# Patient Record
Sex: FEMALE | Race: ASIAN | NOT HISPANIC OR LATINO | Employment: UNEMPLOYED | URBAN - METROPOLITAN AREA
[De-identification: names, ages, dates, MRNs, and addresses within clinical notes are randomized per-mention and may not be internally consistent; named-entity substitution may affect disease eponyms.]

---

## 2018-09-25 ENCOUNTER — HOSPITAL ENCOUNTER (EMERGENCY)
Facility: HOSPITAL | Age: 68
Discharge: HOME | End: 2018-09-25
Attending: EMERGENCY MEDICINE
Payer: MEDICARE

## 2018-09-25 VITALS
SYSTOLIC BLOOD PRESSURE: 155 MMHG | TEMPERATURE: 99 F | RESPIRATION RATE: 18 BRPM | HEART RATE: 66 BPM | WEIGHT: 157 LBS | OXYGEN SATURATION: 99 % | DIASTOLIC BLOOD PRESSURE: 68 MMHG

## 2018-09-25 DIAGNOSIS — I82.4Z2 DVT, LOWER EXTREMITY, DISTAL, ACUTE, LEFT: Primary | ICD-10-CM

## 2018-09-25 LAB
ALBUMIN SERPL BCP-MCNC: 3.7 G/DL
ALP SERPL-CCNC: 82 U/L
ALT SERPL W/O P-5'-P-CCNC: 76 U/L
ANION GAP SERPL CALC-SCNC: 11 MMOL/L
AST SERPL-CCNC: 37 U/L
BASOPHILS # BLD AUTO: 0 K/UL
BASOPHILS NFR BLD: 0.6 %
BILIRUB SERPL-MCNC: 0.4 MG/DL
BUN SERPL-MCNC: 21 MG/DL
CALCIUM SERPL-MCNC: 9.9 MG/DL
CHLORIDE SERPL-SCNC: 104 MMOL/L
CO2 SERPL-SCNC: 24 MMOL/L
CREAT SERPL-MCNC: 0.7 MG/DL
DIFFERENTIAL METHOD: ABNORMAL
EOSINOPHIL # BLD AUTO: 0.1 K/UL
EOSINOPHIL NFR BLD: 2.8 %
ERYTHROCYTE [DISTWIDTH] IN BLOOD BY AUTOMATED COUNT: 13.3 %
EST. GFR  (AFRICAN AMERICAN): >60 ML/MIN/1.73 M^2
EST. GFR  (NON AFRICAN AMERICAN): >60 ML/MIN/1.73 M^2
GLUCOSE SERPL-MCNC: 136 MG/DL
HCT VFR BLD AUTO: 40.3 %
HGB BLD-MCNC: 13.6 G/DL
INR PPP: 1
LYMPHOCYTES # BLD AUTO: 2.4 K/UL
LYMPHOCYTES NFR BLD: 47.3 %
MCH RBC QN AUTO: 32 PG
MCHC RBC AUTO-ENTMCNC: 33.8 G/DL
MCV RBC AUTO: 95 FL
MONOCYTES # BLD AUTO: 0.3 K/UL
MONOCYTES NFR BLD: 6.4 %
NEUTROPHILS # BLD AUTO: 2.2 K/UL
NEUTROPHILS NFR BLD: 42.9 %
PLATELET # BLD AUTO: 189 K/UL
PMV BLD AUTO: 7.9 FL
POTASSIUM SERPL-SCNC: 3.9 MMOL/L
PROT SERPL-MCNC: 6.8 G/DL
PROTHROMBIN TIME: 9.8 SEC
RBC # BLD AUTO: 4.25 M/UL
SODIUM SERPL-SCNC: 139 MMOL/L
WBC # BLD AUTO: 5.1 K/UL

## 2018-09-25 PROCEDURE — 25500020 PHARM REV CODE 255: Performed by: EMERGENCY MEDICINE

## 2018-09-25 PROCEDURE — 36415 COLL VENOUS BLD VENIPUNCTURE: CPT

## 2018-09-25 PROCEDURE — 85025 COMPLETE CBC W/AUTO DIFF WBC: CPT

## 2018-09-25 PROCEDURE — 85610 PROTHROMBIN TIME: CPT

## 2018-09-25 PROCEDURE — 25000003 PHARM REV CODE 250: Performed by: EMERGENCY MEDICINE

## 2018-09-25 PROCEDURE — 99284 EMERGENCY DEPT VISIT MOD MDM: CPT | Mod: 25

## 2018-09-25 PROCEDURE — 80053 COMPREHEN METABOLIC PANEL: CPT

## 2018-09-25 RX ADMIN — IOHEXOL 100 ML: 350 INJECTION, SOLUTION INTRAVENOUS at 06:09

## 2018-09-25 RX ADMIN — RIVAROXABAN 15 MG: 15 TABLET, FILM COATED ORAL at 07:09

## 2018-09-25 NOTE — ED PROVIDER NOTES
"Encounter Date: 9/25/2018    SCRIBE #1 NOTE: I, Ayleen Galarza, am scribing for, and in the presence of, .       History     Chief Complaint   Patient presents with    Leg Pain     arrives with us results.        Time seen by provider: 4:57 PM on 09/25/2018    Tamica Sherman is a 68 y.o. female with HTN, IDDM, HLD,  who presents to the ED with leg swelling onset 1 week. Patient explains that she began having swelling in her left leg and had an US today resulting in a "partial non-obstructing thrombus in the left peroneal vein". She denies any traveling anywhere far, surgeries, immobility, or hormone therapy. Patient endorses having a PCP appointment in 6 days. She complains of intermittent shortness of breath for the past few weeks. Patient denies any chest pain, abdominal pain, vomiting, diarrhea, fever, or back pain.       The history is provided by the patient.     Review of patient's allergies indicates:  No Known Allergies  Past Medical History:   Diagnosis Date    Diabetes mellitus     High cholesterol     Hypertension      History reviewed. No pertinent surgical history.  History reviewed. No pertinent family history.  Social History     Tobacco Use    Smoking status: Never Smoker   Substance Use Topics    Alcohol use: Not on file    Drug use: Not on file     Review of Systems   Constitutional: Negative for fever.   HENT: Negative for drooling and sore throat.    Eyes: Negative for photophobia and visual disturbance.   Respiratory: Positive for shortness of breath. Negative for cough.    Cardiovascular: Positive for leg swelling. Negative for chest pain.   Gastrointestinal: Negative for abdominal pain, diarrhea, nausea and vomiting.   Genitourinary: Negative for dysuria.   Musculoskeletal: Negative for back pain.   Skin: Negative for rash.   Neurological: Negative for weakness and numbness.   Hematological: Does not bruise/bleed easily.   Psychiatric/Behavioral: Negative for confusion.       Physical " Exam     Initial Vitals [09/25/18 1557]   BP Pulse Resp Temp SpO2   (!) 160/72 60 18 98.6 °F (37 °C) 98 %      MAP       --         Physical Exam    Nursing note and vitals reviewed.  Constitutional: She appears well-developed and well-nourished.  Non-toxic appearance. No distress.   HENT:   Head: Normocephalic and atraumatic.   Eyes: EOM are normal. Pupils are equal, round, and reactive to light.   Neck: Normal range of motion. Neck supple. No neck rigidity. No JVD present.   Cardiovascular: Normal rate, regular rhythm, normal heart sounds and intact distal pulses. Exam reveals no gallop and no friction rub.    No murmur heard.  Pulmonary/Chest: Breath sounds normal. She has no wheezes. She has no rhonchi. She has no rales.   Abdominal: Soft. Bowel sounds are normal. She exhibits no distension. There is no tenderness. There is no rigidity, no rebound and no guarding.   Musculoskeletal: Normal range of motion. She exhibits edema.   Pitting edema to LLE.    Neurological: She is alert and oriented to person, place, and time. She has normal strength and normal reflexes. No cranial nerve deficit or sensory deficit. She exhibits normal muscle tone. Coordination normal. GCS eye subscore is 4. GCS verbal subscore is 5. GCS motor subscore is 6.   Skin: Skin is warm and dry.   Psychiatric: She has a normal mood and affect. Her speech is normal and behavior is normal. She is not actively hallucinating.         ED Course   Procedures  Labs Reviewed   CBC W/ AUTO DIFFERENTIAL - Abnormal; Notable for the following components:       Result Value    MCH 32.0 (*)     MPV 7.9 (*)     All other components within normal limits   COMPREHENSIVE METABOLIC PANEL - Abnormal; Notable for the following components:    Glucose 136 (*)     ALT 76 (*)     All other components within normal limits   PROTIME-INR          Imaging Results          CTA Chest Non-Coronary (PE Study) (Final result)  Result time 09/25/18 18:35:40    Final result by  Omar Irizarry MD (09/25/18 18:35:40)                 Impression:      No pulmonary embolus.      Electronically signed by: Omar Irizarry MD  Date:    09/25/2018  Time:    18:35             Narrative:    EXAMINATION:  CTA CHEST NON CORONARY    CLINICAL HISTORY:  Chest pain, acute, PE suspected, high pretest prob;    TECHNIQUE:  Low dose axial images, sagittal and coronal reformations were obtained from the thoracic inlet to the lung bases following the IV administration of 100 mL of Omnipaque 350.  Contrast timing was optimized to evaluate the pulmonary arteries.  MIP images were performed.    COMPARISON:  None    FINDINGS:  No pulmonary emboli present.    Thoracic aorta demonstrates ascending segment ectasia.  No dissection.  Heart demonstrates no significant chamber enlargement.  LAD coronary calcification present.  No pleural or pericardial effusions present.    No mediastinal, hilar or axillary adenopathy.    Lungs demonstrate atelectatic changes.    Abdomen demonstrates hepatic steatosis with benign cysts.  There is chronic compression deformity of T12.  No acute osseous abnormality.                                 Medical Decision Making:   History:   Old Medical Records: I decided to obtain old medical records.  Initial Assessment:   68-year-old woman sent over from her primary care physician's office after having an outpatient ultrasound that showed nonocclusive left peroneal vein DVT.  Patient endorses is some mild pleuritic chest pain with review of systems.  CTA performed shows no evidence of pulmonary embolism.  Patient will be started on Xarelto.  She is discharged in no acute distress. She is to follow up with her PCP for continued management of her medications and DVT.  Return precautions discussed.  Clinical Tests:   Lab Tests: Ordered and Reviewed  Radiological Study: Ordered and Reviewed            Scribe Attestation:   Scribe #1: I performed the above scribed service and the  documentation accurately describes the services I performed. I attest to the accuracy of the note.    I, Gerardo Doyle, personally performed the services described in this documentation. All medical record entries made by the scribe were at my direction and in my presence.  I have reviewed the chart and agree that the record reflects my personal performance and is accurate and complete. Noah Carrillo MD.  9:05 PM 09/26/2018             Clinical Impression:   The encounter diagnosis was DVT, lower extremity, distal, acute, left.      Disposition:   Disposition: Discharged  Condition: Stable                        Noah Carrillo MD  09/26/18 4767

## 2018-09-26 NOTE — ED NOTES
"Presents to the ER with c/o left leg pain and swelling that started a few days ago. Patient reports having an US done today that showed a "Non obstructing embolus." Patient denies any CP or SOB. + pulse to left lower extremity. Mucous membranes are pink and moist. Skin is warm, dry and intact. Lungs are clear bilaterally, respirations are regular and unlabored. Denies cough, congestion, rhinorrhea or SOB. BS active x4, no tenderness with palpation, abd is soft and not distended. Denies any appetite or activity change. S1S2, capillary refill is < 2 seconds. Denies dysuria, difficulty urinating, frequency, numbness, tingling or weakness. SANDRA BALTAZARS    "

## 2018-09-26 NOTE — ED NOTES
Upon discharge, patient is AAOx4, no cardiac or respiratory complications. Follow up care and  Medications have been reviewed with patient and has been instructed to return to the ER if needed. Patient verbalized understanding and ambulated to the lobby without difficulty. PREMA FLORES.

## 2019-01-06 PROBLEM — I82.452 ACUTE DEEP VEIN THROMBOSIS (DVT) OF LEFT PERONEAL VEIN: Status: ACTIVE | Noted: 2019-01-06

## 2019-01-07 ENCOUNTER — TELEPHONE (OUTPATIENT)
Dept: HEMATOLOGY/ONCOLOGY | Facility: CLINIC | Age: 69
End: 2019-01-07

## 2019-01-07 NOTE — TELEPHONE ENCOUNTER
Called pt to r/s the missed apt from today but no answer and v/m is full. Will try again another time. AE

## 2019-01-29 NOTE — PROGRESS NOTES
[facility] Hematolgy/Oncology  History & Physical    Subjective:      Patient ID:   NAME: Tamica Sherman : 1950     69 y.o. female    Referring Doc: Oziel Campos MD  Other Physicians:        Chief Complaint: left DVT    HPI:  69 y.o. female with diagnosis of Left peroneal vein DVT who has been referred by Oziel Campos MD for evaluation by medical hematology. She is here with her daughter. She been having LLE swelling of the LLE for about a year now. She was diagnosed with DVT involving the left peroneal vein in 2018. She denies any other history of clots in past or prior injury or surgery. She denies any knowledge of a family history of clots. She is currently on xarelto. She denies any excessive bleeding or bruising. She denies any CP, SOB, HA's or N/V. She had recent mammogram that was negative per her daughter.               ROS:   GEN: normal without any fever, night sweats or weight loss  HEENT: normal with no HA's, sore throat, stiff neck, changes in vision  CV: normal with no CP, SOB, PND, RUIZ or orthopnea  PULM: normal with no SOB, cough, hemoptysis, sputum or pleuritic pain  GI: normal with no abdominal pain, nausea, vomiting, constipation, diarrhea, melanotic stools, BRBPR, or hematemesis  : normal with no hematuria, dysuria  BREAST: normal with no mass, discharge, pain  SKIN: normal with no rash, erythema, bruising, or swelling       Past Medical/Surgical History:  Past Medical History:   Diagnosis Date    Acute deep vein thrombosis (DVT) of left peroneal vein 2019    Diabetes mellitus     High cholesterol     Hypertension      No past surgical history on file.      Allergies:  Review of patient's allergies indicates:  No Known Allergies    Social/Family History:  Social History     Socioeconomic History    Marital status:      Spouse name: Not on file    Number of children: Not on file    Years of education: Not on file    Highest education level: Not on file   Social  "Needs    Financial resource strain: Not on file    Food insecurity - worry: Not on file    Food insecurity - inability: Not on file    Transportation needs - medical: Not on file    Transportation needs - non-medical: Not on file   Occupational History    Not on file   Tobacco Use    Smoking status: Never Smoker   Substance and Sexual Activity    Alcohol use: Not on file    Drug use: Not on file    Sexual activity: Not on file   Other Topics Concern    Not on file   Social History Narrative    Not on file     No family history on file.      Medications:    Current Outpatient Medications:     metFORMIN (GLUCOPHAGE) 500 MG tablet, Take 500 mg by mouth 2 (two) times daily with meals., Disp: , Rfl:     rivaroxaban (XARELTO) 20 mg Tab, Take 1 tablet (20 mg total) by mouth daily with dinner or evening meal., Disp: 30 tablet, Rfl: 2      Pathology:  Cancer Staging  No matching staging information was found for the patient.      Objective:   Vitals:  Blood pressure (!) 152/79, pulse 72, temperature 98 °F (36.7 °C), resp. rate 20, height 5' 1" (1.549 m), weight 67.3 kg (148 lb 6.4 oz).    Physical Examination:   GEN: no apparent distress, comfortable; AAOx3  HEAD: atraumatic and normocephalic  EYES: no pallor, no icterus, PERRLA  ENT: OMM, no pharyngeal erythema, external ears WNL; no nasal discharge; no thrush  NECK: no masses, thyroid normal, trachea midline, no LAD/LN's, supple  CV: RRR with no murmur; normal pulse; normal S1 and S2; no pedal edema  CHEST: Normal respiratory effort; CTAB; normal breath sounds; no wheeze or crackles  ABDOM: nontender and nondistended; soft; normal bowel sounds; no rebound/guarding  MUSC/Skeletal: ROM normal; no crepitus; joints normal; no deformities or arthropathy  EXTREM: no clubbing, cyanosis, inflammation or swelling  SKIN: no rashes, lesions, ulcers, petechiae or subcutaneous nodules  : no sheridan  NEURO: grossly intact; motor/sensory WNL; AAOx3; no tremors  PSYCH: " normal mood, affect and behavior  LYMPH: normal cervical, supraclavicular, axillary and groin LN's      Labs:   Lab Results   Component Value Date    WBC 5.10 09/25/2018    HGB 13.6 09/25/2018    HCT 40.3 09/25/2018    MCV 95 09/25/2018     09/25/2018    CMP  Sodium   Date Value Ref Range Status   09/25/2018 139 136 - 145 mmol/L Final     Potassium   Date Value Ref Range Status   09/25/2018 3.9 3.5 - 5.1 mmol/L Final     Chloride   Date Value Ref Range Status   09/25/2018 104 95 - 110 mmol/L Final     CO2   Date Value Ref Range Status   09/25/2018 24 23 - 29 mmol/L Final     Glucose   Date Value Ref Range Status   09/25/2018 136 (H) 70 - 110 mg/dL Final     BUN, Bld   Date Value Ref Range Status   09/25/2018 21 8 - 23 mg/dL Final     Creatinine   Date Value Ref Range Status   09/25/2018 0.7 0.5 - 1.4 mg/dL Final     Calcium   Date Value Ref Range Status   09/25/2018 9.9 8.7 - 10.5 mg/dL Final     Total Protein   Date Value Ref Range Status   09/25/2018 6.8 6.0 - 8.4 g/dL Final     Albumin   Date Value Ref Range Status   09/25/2018 3.7 3.5 - 5.2 g/dL Final     Total Bilirubin   Date Value Ref Range Status   09/25/2018 0.4 0.1 - 1.0 mg/dL Final     Comment:     For infants and newborns, interpretation of results should be based  on gestational age, weight and in agreement with clinical  observations.  Premature Infant recommended reference ranges:  Up to 24 hours.............<8.0 mg/dL  Up to 48 hours............<12.0 mg/dL  3-5 days..................<15.0 mg/dL  6-29 days.................<15.0 mg/dL       Alkaline Phosphatase   Date Value Ref Range Status   09/25/2018 82 55 - 135 U/L Final     AST   Date Value Ref Range Status   09/25/2018 37 10 - 40 U/L Final     ALT   Date Value Ref Range Status   09/25/2018 76 (H) 10 - 44 U/L Final     Anion Gap   Date Value Ref Range Status   09/25/2018 11 8 - 16 mmol/L Final     eGFR if    Date Value Ref Range Status   09/25/2018 >60 >60 mL/min/1.73 m^2  Final     eGFR if non    Date Value Ref Range Status   09/25/2018 >60 >60 mL/min/1.73 m^2 Final     Comment:     Calculation used to obtain the estimated glomerular filtration  rate (eGFR) is the CKD-EPI equation.            Radiology/Diagnostic Studies:          All lab results and imaging results have been reviewed and discussed with the patient    Assessment:   (1) 69 y.o. female  with diagnosis of Left peroneal vein DVT who has been referred by Oziel Campos MD for evaluation by medical hematology.   - she is currently on xarelto  - no prior history of clots or family history of clots    (2) DM    (3) HTN    (4) Hypercholesterolemia    (5) chronic pain syndrome involving leg mostly    VISIT DIAGNOSES:              Acute deep vein thrombosis (DVT) of left peroneal vein            Plan:     PLAN:  1. Order clot workup  2. Schedule repeat US of LLE  3. Schedule CT of abdom and pelvis  4. F/u with PCP  RTC in  3-4 weeks   Fax note to Oziel Campos MD        I have explained and the patient understands all of  the current recommendation(s). I have answered all of their questions to the best of my ability and to their complete satisfaction.             Thank you for allowing me to participate in this patient's care. Please call with any questions or concerns.    Electronically signed Johnnie Norton MD

## 2019-01-30 ENCOUNTER — OFFICE VISIT (OUTPATIENT)
Dept: HEMATOLOGY/ONCOLOGY | Facility: CLINIC | Age: 69
End: 2019-01-30
Payer: MEDICARE

## 2019-01-30 VITALS
HEIGHT: 61 IN | TEMPERATURE: 98 F | DIASTOLIC BLOOD PRESSURE: 79 MMHG | WEIGHT: 148.38 LBS | BODY MASS INDEX: 28.01 KG/M2 | RESPIRATION RATE: 20 BRPM | HEART RATE: 72 BPM | SYSTOLIC BLOOD PRESSURE: 152 MMHG

## 2019-01-30 DIAGNOSIS — I82.91 CHRONIC EMBOLISM AND THROMBOSIS OF VEIN: ICD-10-CM

## 2019-01-30 DIAGNOSIS — I82.452 ACUTE DEEP VEIN THROMBOSIS (DVT) OF LEFT PERONEAL VEIN: Primary | ICD-10-CM

## 2019-01-30 PROCEDURE — 99203 OFFICE O/P NEW LOW 30 MIN: CPT | Mod: ,,, | Performed by: INTERNAL MEDICINE

## 2019-01-30 PROCEDURE — 99203 PR OFFICE/OUTPT VISIT, NEW, LEVL III, 30-44 MIN: ICD-10-PCS | Mod: ,,, | Performed by: INTERNAL MEDICINE

## 2019-01-30 RX ORDER — METFORMIN HYDROCHLORIDE 1000 MG/1
1000 TABLET ORAL 2 TIMES DAILY WITH MEALS
COMMUNITY

## 2019-01-30 NOTE — LETTER
January 30, 2019      Oziel Campos MD  [address] [place] [address] [address]  [place] [address]           [facility] - Hematology Oncology  [address]  [address]  [place] [address]  Phone: [phone]  Fax: [phone]          Patient: Tamica Sherman   MR Number: [id number]   YOB: 1950   Date of Visit: 1/30/2019       Dear Dr. Oziel Campos:    Thank you for referring Tamica Sherman to me for evaluation. Attached you will find relevant portions of my assessment and plan of care.    If you have questions, please do not hesitate to call me. I look forward to following Tamica Sherman along with you.    Sincerely,    Johnnie Norton MD    Enclosure  CC:  No Recipients    If you would like to receive this communication electronically, please contact [email or web address] or [phone] to request more information on [employer] Link access.    For providers and/or their staff who would like to refer a patient to [facility], please contact us through our one-stop-shop provider referral line, [facility], at [phone].    If you feel you have received this communication in error or would no longer like to receive these types of communications, please e-mail [email or web address]

## 2019-02-05 LAB
APCR PPP: 2.4 RATIO (ref 2.2–3.5)
AT III ACT/NOR PPP CHRO: 106 % (ref 75–135)
AT III AG PPP IA-ACNC: 97 % (ref 72–124)
CARDIOLIPIN IGG SER IA-ACNC: <9 GPL U/ML (ref 0–14)
CARDIOLIPIN IGM SER IA-ACNC: 18 MPL U/ML (ref 0–12)
CREAT SERPL-MCNC: 0.66 MG/DL (ref 0.57–1)
FACT IX ACT/NOR PPP: 139 % (ref 60–177)
FACT VII AG ACT/NOR PPP IA: 145 %
FACT XIII CLOT DIS 24H PPP QL: NORMAL
HCYS SERPL-SCNC: 9.2 UMOL/L (ref 0–15)
LA 2 SCREEN W REFLEX-IMP: NORMAL
PROT C ACT/NOR PPP: 191 % (ref 73–180)
PROT S ACT/NOR PPP: 84 % (ref 63–140)
PROTHROM ACT/NOR PPP: 127 % (ref 50–154)
PS IGA SER-ACNC: 1 APS IGA (ref 0–20)
PS IGG SER-ACNC: 3 GPS IGG (ref 0–11)
PS IGM SER-ACNC: 18 MPS IGM (ref 0–25)
SCREEN APTT: 32 SEC (ref 0–51.9)
SCREEN DRVVT: 34.4 SEC (ref 0–47)

## 2019-02-06 LAB — MTHFR GENE MUT ANL BLD/T: NORMAL

## 2019-03-31 PROBLEM — R76.0 ANTICARDIOLIPIN ANTIBODY POSITIVE: Status: ACTIVE | Noted: 2019-03-31

## 2019-04-01 ENCOUNTER — OFFICE VISIT (OUTPATIENT)
Dept: HEMATOLOGY/ONCOLOGY | Facility: CLINIC | Age: 69
End: 2019-04-01
Payer: MEDICARE

## 2019-04-01 VITALS
TEMPERATURE: 98 F | BODY MASS INDEX: 26.83 KG/M2 | HEART RATE: 73 BPM | RESPIRATION RATE: 20 BRPM | WEIGHT: 142 LBS | DIASTOLIC BLOOD PRESSURE: 58 MMHG | SYSTOLIC BLOOD PRESSURE: 109 MMHG

## 2019-04-01 DIAGNOSIS — Z15.89 COMPOUND HETEROZYGOUS MTHFR MUTATION C677T/A1298C: ICD-10-CM

## 2019-04-01 DIAGNOSIS — I82.452 ACUTE DEEP VEIN THROMBOSIS (DVT) OF LEFT PERONEAL VEIN: Primary | ICD-10-CM

## 2019-04-01 DIAGNOSIS — R76.0 ANTICARDIOLIPIN ANTIBODY POSITIVE: ICD-10-CM

## 2019-04-01 PROCEDURE — 99215 OFFICE O/P EST HI 40 MIN: CPT | Mod: ,,, | Performed by: INTERNAL MEDICINE

## 2019-04-01 PROCEDURE — 99215 PR OFFICE/OUTPT VISIT, EST, LEVL V, 40-54 MIN: ICD-10-PCS | Mod: ,,, | Performed by: INTERNAL MEDICINE

## 2019-04-01 RX ORDER — GLIPIZIDE 10 MG/1
TABLET ORAL
COMMUNITY
End: 2020-07-30 | Stop reason: CLARIF

## 2019-04-01 RX ORDER — LISINOPRIL AND HYDROCHLOROTHIAZIDE 12.5; 2 MG/1; MG/1
TABLET ORAL
COMMUNITY
End: 2020-07-30 | Stop reason: CLARIF

## 2019-04-01 RX ORDER — IBUPROFEN 600 MG/1
600 TABLET ORAL 3 TIMES DAILY PRN
Status: ON HOLD | COMMUNITY
Start: 2019-01-03 | End: 2020-07-30

## 2019-04-01 NOTE — PROGRESS NOTES
[facility] Hematology/Oncology  PROGRESS NOTE - 2nd Follow-up Visit      Subjective:       Patient ID:   NAME: Tamica Sherman : 1950     69 y.o. female    Referring Doc: Andres  Other Physicians:    Chief Complaint: left DVT f/u        History of Present Illness:     Patient returns today for a 2nd regularly scheduled follow-up visit.  The patient is here today to go over the results of the recently ordered labs, tests and studies. She is here with a female relative (daughter). Labs show that she is heterozygous positive for both MTHFR A and C genes.  She has some itching at this time of the legs but no swelling or erythema. She is breathing ok and denies any CP, SOB, HA's or N/V.             ROS:   GEN: normal without any fever, night sweats or weight loss  HEENT: normal with no HA's, sore throat, stiff neck, changes in vision  CV: normal with no CP, SOB, PND, RUIZ or orthopnea  PULM: normal with no SOB, cough, hemoptysis, sputum or pleuritic pain  GI: normal with no abdominal pain, nausea, vomiting, constipation, diarrhea, melanotic stools, BRBPR, or hematemesis  : normal with no hematuria, dysuria  BREAST: normal with no mass, discharge, pain  SKIN: normal with no rash, erythema, bruising, or swelling; some itching    Allergies:  Review of patient's allergies indicates:  No Known Allergies    Medications:    Current Outpatient Medications:     glipiZIDE (GLUCOTROL) 10 MG tablet, glipizide 10 mg tablet  Take 1 tablet every day by oral route., Disp: , Rfl:     ibuprofen (ADVIL,MOTRIN) 800 MG tablet, , Disp: , Rfl:     lisinopril-hydrochlorothiazide (PRINZIDE,ZESTORETIC) 20-12.5 mg per tablet, lisinopril 20 mg-hydrochlorothiazide 12.5 mg tablet  Take 1 tablet every day by oral route., Disp: , Rfl:     metFORMIN (GLUCOPHAGE) 500 MG tablet, Take 500 mg by mouth 2 (two) times daily with meals., Disp: , Rfl:     rivaroxaban (XARELTO) 20 mg Tab, Take 1 tablet (20 mg total) by mouth daily with dinner or evening meal.,  Disp: 30 tablet, Rfl: 2    PMHx/PSHx Updates:  See patient's last visit with me on 1/30/2019.  See H&P on 1/30/2019        Pathology:  Cancer Staging  No matching staging information was found for the patient.          Objective:     Vitals:  Blood pressure (!) 109/58, pulse 73, temperature 97.6 °F (36.4 °C), resp. rate 20, weight 64.4 kg (142 lb).    Physical Examination:   GEN: no apparent distress, comfortable; AAOx3  HEAD: atraumatic and normocephalic  EYES: no pallor, no icterus, PERRLA  ENT: OMM, no pharyngeal erythema, external ears WNL; no nasal discharge; no thrush  NECK: no masses, thyroid normal, trachea midline, no LAD/LN's, supple  CV: RRR with no murmur; normal pulse; normal S1 and S2; no pedal edema  CHEST: Normal respiratory effort; CTAB; normal breath sounds; no wheeze or crackles  ABDOM: nontender and nondistended; soft; normal bowel sounds; no rebound/guarding  MUSC/Skeletal: ROM normal; no crepitus; joints normal; no deformities or arthropathy  EXTREM: no clubbing, cyanosis, inflammation or swelling  SKIN: no rashes, lesions, ulcers, petechiae or subcutaneous nodules  : no sheridan  NEURO: grossly intact; motor/sensory WNL; AAOx3; no tremors  PSYCH: normal mood, affect and behavior  LYMPH: normal cervical, supraclavicular, axillary and groin LN's            Labs:       1/30/3019    Anticardiolipin IgM 0 - 12 MPL U/mL 18High      MTHFR Comment    Comment: Result: [id number]   Two mutations (C677T and L0099N) identified   Interpretation:   This individual is heterzygous for both the MTHFR C677T and F1320Y   variants (one copy of each).      Lupus Anticoagulant Eval w/reflex   Order: [id number]   Status:  Final result   Visible to patient:  No (Not Released) Next appt:  None Dx:  Acute deep vein thrombosis (DVT) of l...    Ref Range & Units 2mo ago   PTT Lupus Anticoagulant 0.0 - 51.9 sec 32.0    Dilute Viper Venom Time 0.0 - 47.0 sec 34.4    Interpretation  Comment:    Comment: No lupus  anticoagulant was detected.           Homocysteine 0.0 - 15.0 umol/L 9.2       Antithrombin III   Order: [id number]   Status:  Final result   Visible to patient:  No (Not Released) Next appt:  None    Ref Range & Units 2mo ago   Antithrombin Activity 75 - 135 % 106    Comment: Direct Xa inhibitor anticoagulants such as rivaroxaban, apixaban and   edoxaban will lead to spuriously elevated antithrombin activity   levels possibly masking a deficiency.    Antithrombin Antigen, P 72 - 124 % 97                Radiology/Diagnostic Studies:    No results found.    I have reviewed all available lab results and radiology reports.    Assessment/Plan:   (1) 69 y.o. female  with diagnosis of Left peroneal vein DVT who has been referred by Oziel Campos MD for evaluation by medical hematology.   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 recommend consideration for continuation of the xarelto possibly long-term given the possible anticardiolipin issue  2. If she stops the blood thinner at any point in the future, I would then recommend getting a repeat Anticardiolipin IgM about 2 months thereafter  3. Add folbic  4. I discussed the genetic implications in children and siblings with regard to the MTHFR gene abnormalities  RTC in  6 months  Fax note to Oziel Campos MD,    Discussion:       I spent over 25 mins of time with the patient. Reviewed results of the recently ordered labs, tests and studies; made directives with regards to the results.  Over half of this time was spent couseling and coordinating care.    I have explained all of the above in detail and the patient understands all of the current recommendation(s). I have answered all of their questions to the best of my ability and to their complete satisfaction.   The patient is to continue with the current management plan.            Electronically signed by Johnnie Norton MD

## 2019-04-01 NOTE — LETTER
April 1, 2019      Oziel Campos MD  [address] [place] [address] [address]  [place] [address]           [facility] - Hematology Oncology  [address]  [address]  [place] [address]  Phone: [phone]  Fax: [phone]          Patient: Tamica Sherman   MR Number: [id number]   YOB: 1950   Date of Visit: 4/1/2019       Dear Dr. Oziel Campos:    Thank you for referring Tamica Sherman to me for evaluation. Attached you will find relevant portions of my assessment and plan of care.    If you have questions, please do not hesitate to call me. I look forward to following Tamica Sherman along with you.    Sincerely,    Johnnie Norton MD    Enclosure  CC:  No Recipients    If you would like to receive this communication electronically, please contact [email or web address] or [phone] to request more information on [email or web address] access.    For providers and/or their staff who would like to refer a patient to [facility], please contact us through our one-stop-shop provider referral line, [facility], at [phone].    If you feel you have received this communication in error or would no longer like to receive these types of communications, please e-mail [email or web address]

## 2019-11-11 ENCOUNTER — OFFICE VISIT (OUTPATIENT)
Dept: HEMATOLOGY/ONCOLOGY | Facility: CLINIC | Age: 69
End: 2019-11-11
Payer: MEDICARE

## 2019-11-11 VITALS
BODY MASS INDEX: 26.55 KG/M2 | SYSTOLIC BLOOD PRESSURE: 107 MMHG | RESPIRATION RATE: 18 BRPM | DIASTOLIC BLOOD PRESSURE: 65 MMHG | WEIGHT: 140.5 LBS | HEART RATE: 72 BPM | TEMPERATURE: 98 F

## 2019-11-11 DIAGNOSIS — R76.0 ANTICARDIOLIPIN ANTIBODY POSITIVE: Primary | ICD-10-CM

## 2019-11-11 DIAGNOSIS — I82.452 ACUTE DEEP VEIN THROMBOSIS (DVT) OF LEFT PERONEAL VEIN: ICD-10-CM

## 2019-11-11 DIAGNOSIS — Z15.89 COMPOUND HETEROZYGOUS MTHFR MUTATION C677T/A1298C: ICD-10-CM

## 2019-11-11 PROCEDURE — 99213 PR OFFICE/OUTPT VISIT, EST, LEVL III, 20-29 MIN: ICD-10-PCS | Mod: S$GLB,,, | Performed by: INTERNAL MEDICINE

## 2019-11-11 PROCEDURE — 99213 OFFICE O/P EST LOW 20 MIN: CPT | Mod: S$GLB,,, | Performed by: INTERNAL MEDICINE

## 2019-11-11 RX ORDER — DICLOFENAC SODIUM 10 MG/G
GEL TOPICAL
Status: ON HOLD | COMMUNITY
Start: 2019-10-14 | End: 2020-07-30

## 2019-11-11 RX ORDER — DULAGLUTIDE 0.75 MG/.5ML
INJECTION, SOLUTION SUBCUTANEOUS
Status: ON HOLD | COMMUNITY
Start: 2019-10-14 | End: 2020-07-30

## 2019-11-11 RX ORDER — ICOSAPENT ETHYL 1000 MG/1
1 CAPSULE ORAL 4 TIMES DAILY
COMMUNITY
Start: 2019-10-14

## 2019-11-11 RX ORDER — HYDROCORTISONE 25 MG/G
CREAM TOPICAL
COMMUNITY
Start: 2019-10-14 | End: 2020-07-30 | Stop reason: CLARIF

## 2019-11-11 RX ORDER — CANAGLIFLOZIN 300 MG/1
300 TABLET, FILM COATED ORAL DAILY
COMMUNITY
Start: 2019-10-14

## 2019-11-11 NOTE — PROGRESS NOTES
[facility] Hematology/Oncology  PROGRESS NOTE -   Follow-up Visit      Subjective:       Patient ID:   NAME: Tamica Sherman : 1950     69 y.o. female    Referring Doc: Andres  Other Physicians:    Chief Complaint: left DVT f/u        History of Present Illness:     Patient returns today for a regularly scheduled follow-up visit.  The patient is here today to go over the results of the recently ordered labs, tests and studies. She is here with her  and daughter. Labs previously showed that she is heterozygous positive for both MTHFR A and C genes.  No swelling or erythema. She is breathing ok and denies any CP, SOB, HA's or N/V. She is now off the blood thinners.             ROS:   GEN: normal without any fever, night sweats or weight loss  HEENT: normal with no HA's, sore throat, stiff neck, changes in vision  CV: normal with no CP, SOB, PND, RUIZ or orthopnea  PULM: normal with no SOB, cough, hemoptysis, sputum or pleuritic pain  GI: normal with no abdominal pain, nausea, vomiting, constipation, diarrhea, melanotic stools, BRBPR, or hematemesis  : normal with no hematuria, dysuria  BREAST: normal with no mass, discharge, pain  SKIN: normal with no rash, erythema, bruising, or swelling;     Allergies:  Review of patient's allergies indicates:  No Known Allergies    Medications:    Current Outpatient Medications:     folic acid-vit B6-vit B12 2.5-25-2 mg (FOLBIC OR EQUIV) 2.5-25-2 mg Tab, Take 1 tablet by mouth once daily., Disp: 30 tablet, Rfl: 6    glipiZIDE (GLUCOTROL) 10 MG tablet, glipizide 10 mg tablet  Take 1 tablet every day by oral route., Disp: , Rfl:     hydrocortisone 2.5 % cream, , Disp: , Rfl:     INVOKANA 300 mg Tab tablet, , Disp: , Rfl:     lisinopril-hydrochlorothiazide (PRINZIDE,ZESTORETIC) 20-12.5 mg per tablet, lisinopril 20 mg-hydrochlorothiazide 12.5 mg tablet  Take 1 tablet every day by oral route., Disp: , Rfl:     metFORMIN (GLUCOPHAGE) 500 MG tablet, Take 500 mg by mouth 2 (two)  times daily with meals., Disp: , Rfl:     rivaroxaban (XARELTO) 20 mg Tab, Take 1 tablet (20 mg total) by mouth daily with dinner or evening meal., Disp: 30 tablet, Rfl: 2    TRULICITY 0.75 mg/0.5 mL PnIj, , Disp: , Rfl:     VASCEPA 1 gram Cap, , Disp: , Rfl:     VOLTAREN 1 % Gel, , Disp: , Rfl:     ibuprofen (ADVIL,MOTRIN) 800 MG tablet, , Disp: , Rfl:     PMHx/PSHx Updates:  See patient's last visit with me on 4/1/2019.  See H&P on 1/30/2019        Pathology:  Cancer Staging  No matching staging information was found for the patient.          Objective:     Vitals:  Blood pressure 107/65, pulse 72, temperature 97.5 °F (36.4 °C), resp. rate 18, weight 63.7 kg (140 lb 8 oz).    Physical Examination:   GEN: no apparent distress, comfortable; AAOx3  HEAD: atraumatic and normocephalic  EYES: no pallor, no icterus, PERRLA  ENT: OMM, no pharyngeal erythema, external ears WNL; no nasal discharge; no thrush  NECK: no masses, thyroid normal, trachea midline, no LAD/LN's, supple  CV: RRR with no murmur; normal pulse; normal S1 and S2; no pedal edema  CHEST: Normal respiratory effort; CTAB; normal breath sounds; no wheeze or crackles  ABDOM: nontender and nondistended; soft; normal bowel sounds; no rebound/guarding  MUSC/Skeletal: ROM normal; no crepitus; joints normal; no deformities or arthropathy  EXTREM: no clubbing, cyanosis, inflammation or swelling  SKIN: no rashes, lesions, ulcers, petechiae or subcutaneous nodules  : no sheridan  NEURO: grossly intact; motor/sensory WNL; AAOx3; no tremors  PSYCH: normal mood, affect and behavior  LYMPH: normal cervical, supraclavicular, axillary and groin LN's            Labs:       8/9/2019 on chart        Radiology/Diagnostic Studies:    No results found.    I have reviewed all available lab results and radiology reports.    Assessment/Plan:   (1) 69 y.o. female  with diagnosis of Left peroneal vein DVT who has been referred by Oziel Campos MD for evaluation by [facility]  hematology.   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 Previously recommend consideration for continuation of the xarelto possibly long-term given the possible anticardiolipin issue but she has since discontinued  2. repeat Anticardiolipin IgM    3. continue folbic     RTC in  6 months  Fax note to Oziel Campos MD,    Discussion:       I spent over 25 mins of time with the patient. Reviewed results of the recently ordered labs, tests and studies; made directives with regards to the results. Over half of this time was spent couseling and coordinating care.    I have explained all of the above in detail and the patient understands all of the current recommendation(s). I have answered all of their questions to the best of my ability and to their complete satisfaction.   The patient is to continue with the current management plan.            Electronically signed by Johnnie Norton MD

## 2019-11-13 LAB — CARDIOLIPIN IGM SER IA-ACNC: 17 MPL U/ML (ref 0–12)

## 2019-12-11 ENCOUNTER — TELEPHONE (OUTPATIENT)
Dept: HEMATOLOGY/ONCOLOGY | Facility: CLINIC | Age: 69
End: 2019-12-11

## 2019-12-11 DIAGNOSIS — R76.0 ANTICARDIOLIPIN ANTIBODY POSITIVE: Primary | ICD-10-CM

## 2019-12-11 DIAGNOSIS — Z15.89 COMPOUND HETEROZYGOUS MTHFR MUTATION C677T/A1298C: ICD-10-CM

## 2019-12-11 DIAGNOSIS — I82.452 ACUTE DEEP VEIN THROMBOSIS (DVT) OF LEFT PERONEAL VEIN: ICD-10-CM

## 2019-12-11 NOTE — TELEPHONE ENCOUNTER
Called patient and instructed her that I put in her labs for her appointment in March to [facility].

## 2019-12-11 NOTE — TELEPHONE ENCOUNTER
----- Message from Wen Perla sent at 12/11/2019  1:53 PM CST -----  Need updated lab orders be put in for [facility], patient is coming in March 2020.    Thanks

## 2020-07-23 ENCOUNTER — OFFICE VISIT (OUTPATIENT)
Dept: PRIMARY CARE CLINIC | Facility: CLINIC | Age: 70
End: 2020-07-23
Payer: MEDICARE

## 2020-07-23 VITALS
OXYGEN SATURATION: 94 % | HEART RATE: 66 BPM | SYSTOLIC BLOOD PRESSURE: 131 MMHG | DIASTOLIC BLOOD PRESSURE: 66 MMHG | RESPIRATION RATE: 18 BRPM | TEMPERATURE: 98 F

## 2020-07-23 DIAGNOSIS — R05.9 COUGH: ICD-10-CM

## 2020-07-23 PROCEDURE — U0003 INFECTIOUS AGENT DETECTION BY NUCLEIC ACID (DNA OR RNA); SEVERE ACUTE RESPIRATORY SYNDROME CORONAVIRUS 2 (SARS-COV-2) (CORONAVIRUS DISEASE [COVID-19]), AMPLIFIED PROBE TECHNIQUE, MAKING USE OF HIGH THROUGHPUT TECHNOLOGIES AS DESCRIBED BY CMS-2020-01-R: HCPCS

## 2020-07-23 PROCEDURE — 99213 PR OFFICE/OUTPT VISIT, EST, LEVL III, 20-29 MIN: ICD-10-PCS | Mod: S$GLB,,, | Performed by: NURSE PRACTITIONER

## 2020-07-23 PROCEDURE — 99213 OFFICE O/P EST LOW 20 MIN: CPT | Mod: S$GLB,,, | Performed by: NURSE PRACTITIONER

## 2020-07-23 NOTE — PATIENT INSTRUCTIONS
Instructions for Patients with Confirmed or Suspected COVID-19    If you are awaiting your test result, you will either be called or it will be released to the patient portal.  If you have any questions about your test, please visit [email or web address] or call our COVID-19 information line at [phone].      Instructions for non-hospitalized or discharged patients with confirmed or suspected COVID-19:       Stay home except to get medical care.    Separate yourself from other people and animals in your home.    Call ahead before visiting your doctor.    Wear a face mask.    Cover your coughs and sneezes.    Clean your hands often.    Avoid sharing personal household items.    Clean all high-touch surfaces every day.    Monitor your symptoms. Seek prompt medical attention if your illness is worsening (e.g., difficulty breathing). Before seeking care, call your healthcare provider.    If you have a medical emergency and must call 911, notify the dispatcher that you have or are being evaluated for COVID-19. If possible, put on a face mask before emergency medical services arrive.    Use the following symptom-based strategy to return to normal activity following a suspected or confirmed case of COVID-19. Continue isolation until:   o At least 3 days (72 hours) have passed since recovery defined as resolution of fever without the use of fever-reducing medications and improvement in respiratory symptoms (e.g. cough, shortness of breath), and   o At least 10 days have passed since the first positive test.       As one of the next steps, you will receive a call or text from the [place] Department of Health ([facility]) COVID-19 Tracing Team. See the contact information below so you know not to ignore the health departments call. It is important that you contact them back immediately so they can help.     Contact Tracer Number:  [phone]  Caller ID for most carriers: [place] [facility]    What is  contact tracing?   Contact tracing is a process that helps identify everyone who has been in close contact with an infected person. Contact tracers let those people know they may have been exposed and guide them on next steps. Confidentiality is important for everyone; no one will be told who may have exposed them to the virus.   Your involvement is important. The more we know about where and how this virus is spreading, the better chance we have at stopping it from spreading further.  What does exposure mean?   Exposure means you have been within 6 feet for more than 15 minutes with a person who has or had COVID-19.  What kind of questions do the contact tracers ask?   A contact tracer will confirm your basic contact information including name, address, phone number, and next of kin, as well as asking about any symptoms you may have had. Theyll also ask you how you think you may have gotten sick, such as places where you may have been exposed to the virus, and people you were with. Those names will never be shared with anyone outside of that call, and will only be used to help trace and stop the spread of the virus.   I have privacy concerns. How will the state use my information?   Your privacy about your health is important. All calls are completed using call centers that use the appropriate health privacy protection measures (HIPAA compliance), meaning that your patient information is safe. No one will ever ask you any questions related to immigration status. Your health comes first.   Do I have to participate?   You do not have to participate, but we strongly encourage you to. Contact tracing can help us catch and control new outbreaks as theyre developing to keep your friends and family safe.   What if I dont hear from anyone?   If you dont receive a call within 24 hours, you can call the number above right away to inquire about your status. That line is open from 8:00 am - 8:00 p.m., 7 days a  week.  Contact tracing saves lives! Together, we have the power to beat this virus and keep our loved ones and neighbors safe.       Instructions for household members, intimate partners and caregivers in a non-healthcare setting of a patient with confirmed or suspected COVID-19:         Close contacts should monitor their health and call their healthcare provider right away if they develop symptoms suggestive of COVID-19 (e.g., fever, cough, shortness of breath).    Stay home except to get medical care. Separate yourself from other people and animals in the home.   Monitor the patients symptoms. If the patient is getting sicker, call his or her healthcare provider. If the patient has a medical emergency and you need to call 911, notify the dispatch personnel that the patient has or is being evaluated for COVID-19.    Wear a facemask when around other people such as sharing a room or vehicle and before entering a healthcare provider's office.   Cover coughs and sneezes with a tissue. Throw used tissues in a lined trash can immediately and wash hands.   Clean hands often with soap and water for at least 20 seconds or with an alcohol-based hand , rubbing hands together until they feel dry. Avoid touching your eyes, nose, and mouth with unwashed hands.   Clean all high-touch; surfaces every day, including counters, tabletops, doorknobs, bathroom fixtures, toilets, phones, keyboards, tablets, bedside tables, etc. Use a household cleaning spray or wipe according to label instructions.   Avoid sharing personal household items such as dishes, drinking glasses, cups, towels, bedding, etc. After these items are used, they should be washed thoroughly with soap and water.   Continue isolation until:   At least 3 days (72 hours) have passed since recovery defined as resolution of fever without the use of fever-reducing medications and improvement in respiratory symptoms (e.g. cough, shortness of breath),  and    At least 10 days have passed since the patients first positive test.    https://www.cdc.gov/coronavirus/2019-ncov/your-health/index.htm

## 2020-07-23 NOTE — PROGRESS NOTES
Subjective:        Time seen by provider: 11:55 AM on 07/23/2020    Tamica Sherman is a 70 y.o. female with PMHx of HTN and DM who presents for an evaluation of possible COVID-19. The patient c/o cough x 3 days. She denies fever, SOB, or any other symptoms at this time. No pertinent PSHx.     Review of Systems   Constitutional: Negative for activity change, appetite change, fatigue and fever.   HENT: Negative for congestion, rhinorrhea and sore throat.    Respiratory: Positive for cough. Negative for chest tightness, shortness of breath and wheezing.    Cardiovascular: Negative for chest pain and palpitations.   Gastrointestinal: Negative for diarrhea, nausea and vomiting.   Musculoskeletal: Negative for arthralgias and myalgias.   Skin: Negative for rash.   Neurological: Negative for weakness, light-headedness, numbness and headaches.       Objective:      Physical Exam  Vitals signs and nursing note reviewed.   Constitutional:       General: She is not in acute distress.     Appearance: She is well-developed. She is not diaphoretic.   HENT:      Head: Normocephalic and atraumatic.      Nose: Nose normal.   Eyes:      Conjunctiva/sclera: Conjunctivae normal.   Neck:      Musculoskeletal: Normal range of motion.   Cardiovascular:      Rate and Rhythm: Normal rate and regular rhythm.      Heart sounds: Normal heart sounds. No murmur.   Pulmonary:      Effort: No respiratory distress.      Breath sounds: Normal breath sounds. No wheezing.   Musculoskeletal: Normal range of motion.   Skin:     General: Skin is warm and dry.   Neurological:      Mental Status: She is alert and oriented to person, place, and time.         Assessment:       1. Viral URI  Plan:       1. Viral URI  The patient appears to have a viral upper respiratory infection.  COVID-19 test pending. Based upon the history and physical exam the patient does not appear to have a serious bacterial infection such as pneumonia, sepsis, otitis media, bacterial  sinusitis, strep pharyngitis, parapharyngeal or peritonsillar abscess, meningitis.  Patient appears very well and I have given specific return precautions to the patient and/or family members.  The patient can take over the counter medications and does not appear to need antibiotics at this time. Due to language barrier, an  (patient brought family member for ; refuses facility ) was present during the history-taking and subsequent discussion (and for part of the physical exam) with this patient.      2. Discharge home and await results.   3. Return to clinic or ED for new or worsening symptoms.   4. Follow-up with PCP as needed.     Scribe Attestation:   I, Dianne Bowman, am scribing for, and in the presence of, FORTUNATO Riley. I performed the above scribed service and the documentation accurately describes the services I performed. I attest to the accuracy of the note.  I, FORTUNATO Riley, personally performed the services described in this documentation. All medical record entries made by the scribe were at my direction and in my presence.  I have reviewed the chart and agree that the record reflects my personal performance and is accurate and complete. FORTUNATO Riley.  1:17 PM 07/23/2020

## 2020-07-24 LAB — SARS-COV-2 RNA RESP QL NAA+PROBE: DETECTED

## 2020-07-27 ENCOUNTER — TELEPHONE (OUTPATIENT)
Dept: HEMATOLOGY/ONCOLOGY | Facility: CLINIC | Age: 70
End: 2020-07-27

## 2020-07-27 NOTE — TELEPHONE ENCOUNTER
Called the patient and spoke with her daughter.  I instructed her that her mom is Covid positive.  I instructed her that she needs to self isolate for 14 days.  I instructed her that she needs to repeat the test in 14 days.  I instructed her that she needs to be negative before she can come in the building.  I instructed her to send the patient to the ER if she develops temperature greater than 100.5, SOB or cough.

## 2020-07-27 NOTE — TELEPHONE ENCOUNTER
----- Message from Johnnie Norton MD sent at 7/26/2020  3:30 PM CDT -----  Make note that she is positive

## 2020-07-30 ENCOUNTER — HOSPITAL ENCOUNTER (INPATIENT)
Facility: HOSPITAL | Age: 70
LOS: 2 days | Discharge: HOME | DRG: 177 | End: 2020-08-01
Attending: EMERGENCY MEDICINE | Admitting: INTERNAL MEDICINE
Payer: MEDICARE

## 2020-07-30 DIAGNOSIS — Z20.822 SUSPECTED COVID-19 VIRUS INFECTION: ICD-10-CM

## 2020-07-30 DIAGNOSIS — U07.1 COVID-19 VIRUS INFECTION: ICD-10-CM

## 2020-07-30 DIAGNOSIS — I82.452 ACUTE DEEP VEIN THROMBOSIS (DVT) OF LEFT PERONEAL VEIN: ICD-10-CM

## 2020-07-30 DIAGNOSIS — Z03.89 RULED OUT FOR MYOCARDIAL INFARCTION: ICD-10-CM

## 2020-07-30 DIAGNOSIS — R76.0 ANTICARDIOLIPIN ANTIBODY POSITIVE: ICD-10-CM

## 2020-07-30 DIAGNOSIS — Z15.89 COMPOUND HETEROZYGOUS MTHFR MUTATION C677T/A1298C: ICD-10-CM

## 2020-07-30 DIAGNOSIS — R09.02 HYPOXIA: ICD-10-CM

## 2020-07-30 DIAGNOSIS — J18.9 PNEUMONIA OF BOTH LUNGS DUE TO INFECTIOUS ORGANISM, UNSPECIFIED PART OF LUNG: Primary | ICD-10-CM

## 2020-07-30 PROBLEM — Z79.4 TYPE 2 DIABETES MELLITUS, WITH LONG-TERM CURRENT USE OF INSULIN: Status: ACTIVE | Noted: 2020-07-30

## 2020-07-30 PROBLEM — I15.2 HYPERTENSION ASSOCIATED WITH TYPE 2 DIABETES MELLITUS: Status: ACTIVE | Noted: 2020-07-30

## 2020-07-30 PROBLEM — E11.59 HYPERTENSION ASSOCIATED WITH TYPE 2 DIABETES MELLITUS: Status: ACTIVE | Noted: 2020-07-30

## 2020-07-30 PROBLEM — E11.69 HYPERLIPIDEMIA ASSOCIATED WITH TYPE 2 DIABETES MELLITUS: Status: ACTIVE | Noted: 2020-07-30

## 2020-07-30 PROBLEM — E11.9 TYPE 2 DIABETES MELLITUS, WITH LONG-TERM CURRENT USE OF INSULIN: Status: ACTIVE | Noted: 2020-07-30

## 2020-07-30 PROBLEM — E78.5 HYPERLIPIDEMIA ASSOCIATED WITH TYPE 2 DIABETES MELLITUS: Status: ACTIVE | Noted: 2020-07-30

## 2020-07-30 PROBLEM — J12.82 PNEUMONIA DUE TO COVID-19 VIRUS: Status: ACTIVE | Noted: 2020-07-30

## 2020-07-30 LAB
ALBUMIN SERPL BCP-MCNC: 3 G/DL (ref 3.5–5.2)
ALP SERPL-CCNC: 62 U/L (ref 55–135)
ALT SERPL W/O P-5'-P-CCNC: 20 U/L (ref 10–44)
ANION GAP SERPL CALC-SCNC: 10 MMOL/L (ref 8–16)
AST SERPL-CCNC: 23 U/L (ref 10–40)
BASOPHILS # BLD AUTO: 0.01 K/UL (ref 0–0.2)
BASOPHILS NFR BLD: 0.2 % (ref 0–1.9)
BILIRUB SERPL-MCNC: 0.4 MG/DL (ref 0.1–1)
BNP SERPL-MCNC: 35 PG/ML (ref 0–99)
BUN SERPL-MCNC: 18 MG/DL (ref 8–23)
CALCIUM SERPL-MCNC: 8.6 MG/DL (ref 8.7–10.5)
CHLORIDE SERPL-SCNC: 104 MMOL/L (ref 95–110)
CK SERPL-CCNC: 45 U/L (ref 20–180)
CO2 SERPL-SCNC: 25 MMOL/L (ref 23–29)
CREAT SERPL-MCNC: 0.7 MG/DL (ref 0.5–1.4)
CRP SERPL-MCNC: 13.3 MG/L (ref 0–8.2)
D DIMER PPP IA.FEU-MCNC: 17.29 MG/L FEU
DIFFERENTIAL METHOD: ABNORMAL
EOSINOPHIL # BLD AUTO: 0.1 K/UL (ref 0–0.5)
EOSINOPHIL NFR BLD: 1.5 % (ref 0–8)
ERYTHROCYTE [DISTWIDTH] IN BLOOD BY AUTOMATED COUNT: 11.8 % (ref 11.5–14.5)
EST. GFR  (AFRICAN AMERICAN): >60 ML/MIN/1.73 M^2
EST. GFR  (NON AFRICAN AMERICAN): >60 ML/MIN/1.73 M^2
FERRITIN SERPL-MCNC: 768 NG/ML (ref 20–300)
GLUCOSE SERPL-MCNC: 136 MG/DL (ref 70–110)
HCT VFR BLD AUTO: 43.1 % (ref 37–48.5)
HGB BLD-MCNC: 13.9 G/DL (ref 12–16)
IMM GRANULOCYTES # BLD AUTO: 0.02 K/UL (ref 0–0.04)
IMM GRANULOCYTES NFR BLD AUTO: 0.4 % (ref 0–0.5)
LACTATE SERPL-SCNC: 1 MMOL/L (ref 0.5–2.2)
LDH SERPL L TO P-CCNC: 309 U/L (ref 110–260)
LYMPHOCYTES # BLD AUTO: 1.4 K/UL (ref 1–4.8)
LYMPHOCYTES NFR BLD: 30.4 % (ref 18–48)
MAGNESIUM SERPL-MCNC: 2.1 MG/DL (ref 1.6–2.6)
MCH RBC QN AUTO: 31.2 PG (ref 27–31)
MCHC RBC AUTO-ENTMCNC: 32.3 G/DL (ref 32–36)
MCV RBC AUTO: 97 FL (ref 82–98)
MONOCYTES # BLD AUTO: 0.3 K/UL (ref 0.3–1)
MONOCYTES NFR BLD: 6.7 % (ref 4–15)
NEUTROPHILS # BLD AUTO: 2.8 K/UL (ref 1.8–7.7)
NEUTROPHILS NFR BLD: 60.8 % (ref 38–73)
NRBC BLD-RTO: 0 /100 WBC
PLATELET # BLD AUTO: 191 K/UL (ref 150–350)
PMV BLD AUTO: 9.7 FL (ref 9.2–12.9)
POTASSIUM SERPL-SCNC: 3.9 MMOL/L (ref 3.5–5.1)
PROCALCITONIN SERPL IA-MCNC: <0.02 NG/ML
PROT SERPL-MCNC: 6.9 G/DL (ref 6–8.4)
RBC # BLD AUTO: 4.46 M/UL (ref 4–5.4)
SODIUM SERPL-SCNC: 139 MMOL/L (ref 136–145)
TROPONIN I SERPL DL<=0.01 NG/ML-MCNC: 0.01 NG/ML (ref 0–0.03)
TROPONIN I SERPL DL<=0.01 NG/ML-MCNC: 0.01 NG/ML (ref 0–0.03)
WBC # BLD AUTO: 4.64 K/UL (ref 3.9–12.7)

## 2020-07-30 PROCEDURE — 86140 C-REACTIVE PROTEIN: CPT

## 2020-07-30 PROCEDURE — 85025 COMPLETE CBC W/AUTO DIFF WBC: CPT

## 2020-07-30 PROCEDURE — 84484 ASSAY OF TROPONIN QUANT: CPT | Mod: 91

## 2020-07-30 PROCEDURE — 12000002 HC ACUTE/MED SURGE SEMI-PRIVATE ROOM

## 2020-07-30 PROCEDURE — 84484 ASSAY OF TROPONIN QUANT: CPT

## 2020-07-30 PROCEDURE — 80053 COMPREHEN METABOLIC PANEL: CPT

## 2020-07-30 PROCEDURE — 96368 THER/DIAG CONCURRENT INF: CPT

## 2020-07-30 PROCEDURE — 94761 N-INVAS EAR/PLS OXIMETRY MLT: CPT

## 2020-07-30 PROCEDURE — 63600175 PHARM REV CODE 636 W HCPCS: Performed by: EMERGENCY MEDICINE

## 2020-07-30 PROCEDURE — 93005 ELECTROCARDIOGRAM TRACING: CPT

## 2020-07-30 PROCEDURE — 82306 VITAMIN D 25 HYDROXY: CPT

## 2020-07-30 PROCEDURE — 85379 FIBRIN DEGRADATION QUANT: CPT

## 2020-07-30 PROCEDURE — 83880 ASSAY OF NATRIURETIC PEPTIDE: CPT

## 2020-07-30 PROCEDURE — 36415 COLL VENOUS BLD VENIPUNCTURE: CPT

## 2020-07-30 PROCEDURE — 83615 LACTATE (LD) (LDH) ENZYME: CPT

## 2020-07-30 PROCEDURE — 96365 THER/PROPH/DIAG IV INF INIT: CPT

## 2020-07-30 PROCEDURE — 82550 ASSAY OF CK (CPK): CPT

## 2020-07-30 PROCEDURE — 83735 ASSAY OF MAGNESIUM: CPT

## 2020-07-30 PROCEDURE — 83605 ASSAY OF LACTIC ACID: CPT

## 2020-07-30 PROCEDURE — 99285 EMERGENCY DEPT VISIT HI MDM: CPT | Mod: 25

## 2020-07-30 PROCEDURE — 25000003 PHARM REV CODE 250: Performed by: EMERGENCY MEDICINE

## 2020-07-30 PROCEDURE — 25000003 PHARM REV CODE 250: Performed by: NURSE PRACTITIONER

## 2020-07-30 PROCEDURE — 84145 PROCALCITONIN (PCT): CPT

## 2020-07-30 PROCEDURE — 82728 ASSAY OF FERRITIN: CPT

## 2020-07-30 PROCEDURE — 96366 THER/PROPH/DIAG IV INF ADDON: CPT

## 2020-07-30 RX ORDER — AZITHROMYCIN 250 MG/1
250 TABLET, FILM COATED ORAL DAILY
Status: DISCONTINUED | OUTPATIENT
Start: 2020-07-31 | End: 2020-08-01 | Stop reason: HOSPADM

## 2020-07-30 RX ORDER — IBUPROFEN 200 MG
24 TABLET ORAL
Status: CANCELLED | OUTPATIENT
Start: 2020-07-30

## 2020-07-30 RX ORDER — POTASSIUM CHLORIDE 20 MEQ/15ML
40 SOLUTION ORAL
Status: CANCELLED | OUTPATIENT
Start: 2020-07-30

## 2020-07-30 RX ORDER — ENOXAPARIN SODIUM 100 MG/ML
40 INJECTION SUBCUTANEOUS EVERY 24 HOURS
Status: CANCELLED | OUTPATIENT
Start: 2020-07-30

## 2020-07-30 RX ORDER — AMLODIPINE BESYLATE 5 MG/1
5 TABLET ORAL DAILY
Status: DISCONTINUED | OUTPATIENT
Start: 2020-07-30 | End: 2020-08-01 | Stop reason: HOSPADM

## 2020-07-30 RX ORDER — SODIUM,POTASSIUM PHOSPHATES 280-250MG
2 POWDER IN PACKET (EA) ORAL
Status: CANCELLED | OUTPATIENT
Start: 2020-07-30

## 2020-07-30 RX ORDER — INSULIN ASPART 100 [IU]/ML
0-5 INJECTION, SOLUTION INTRAVENOUS; SUBCUTANEOUS
Status: CANCELLED | OUTPATIENT
Start: 2020-07-30

## 2020-07-30 RX ORDER — ASCORBIC ACID 500 MG
500 TABLET ORAL 2 TIMES DAILY
Status: DISCONTINUED | OUTPATIENT
Start: 2020-07-30 | End: 2020-08-01 | Stop reason: HOSPADM

## 2020-07-30 RX ORDER — LANOLIN ALCOHOL/MO/W.PET/CERES
800 CREAM (GRAM) TOPICAL
Status: CANCELLED | OUTPATIENT
Start: 2020-07-30

## 2020-07-30 RX ORDER — ONDANSETRON 2 MG/ML
8 INJECTION INTRAMUSCULAR; INTRAVENOUS EVERY 8 HOURS PRN
Status: CANCELLED | OUTPATIENT
Start: 2020-07-30

## 2020-07-30 RX ORDER — AMLODIPINE BESYLATE 5 MG/1
5 TABLET ORAL DAILY
Status: DISCONTINUED | OUTPATIENT
Start: 2020-07-31 | End: 2020-07-30

## 2020-07-30 RX ORDER — GLUCAGON 1 MG
1 KIT INJECTION
Status: CANCELLED | OUTPATIENT
Start: 2020-07-30

## 2020-07-30 RX ORDER — ACETAMINOPHEN 325 MG/1
650 TABLET ORAL EVERY 8 HOURS PRN
Status: CANCELLED | OUTPATIENT
Start: 2020-07-30

## 2020-07-30 RX ORDER — SIMVASTATIN 10 MG/1
10 TABLET, FILM COATED ORAL NIGHTLY
COMMUNITY

## 2020-07-30 RX ORDER — SODIUM CHLORIDE 0.9 % (FLUSH) 0.9 %
10 SYRINGE (ML) INJECTION
Status: CANCELLED | OUTPATIENT
Start: 2020-07-30

## 2020-07-30 RX ORDER — IBUPROFEN 200 MG
16 TABLET ORAL
Status: CANCELLED | OUTPATIENT
Start: 2020-07-30

## 2020-07-30 RX ADMIN — AMLODIPINE BESYLATE 5 MG: 5 TABLET ORAL at 05:07

## 2020-07-30 RX ADMIN — RIVAROXABAN 20 MG: 20 TABLET, FILM COATED ORAL at 06:07

## 2020-07-30 RX ADMIN — AZITHROMYCIN MONOHYDRATE 500 MG: 500 INJECTION, POWDER, LYOPHILIZED, FOR SOLUTION INTRAVENOUS at 12:07

## 2020-07-30 RX ADMIN — CEFTRIAXONE 1 G: 1 INJECTION, SOLUTION INTRAVENOUS at 12:07

## 2020-07-30 RX ADMIN — Medication 500 MG: at 09:07

## 2020-07-30 RX ADMIN — SODIUM CHLORIDE 1000 ML: 0.9 INJECTION, SOLUTION INTRAVENOUS at 12:07

## 2020-07-30 NOTE — SUBJECTIVE & OBJECTIVE
Past Medical History:   Diagnosis Date    Acute deep vein thrombosis (DVT) of left peroneal vein 1/6/2019    Anticardiolipin antibody positive 3/31/2019    Compound heterozygous MTHFR mutation [id number] 4/1/2019    Diabetes mellitus     High cholesterol     Hypertension        History reviewed. No pertinent surgical history.    Review of patient's allergies indicates:  No Known Allergies    No current facility-administered medications on file prior to encounter.      Current Outpatient Medications on File Prior to Encounter   Medication Sig    folic acid-vit B6-vit B12 2.5-25-2 mg (FOLBIC OR EQUIV) 2.5-25-2 mg Tab Take 1 tablet by mouth once daily.    ibuprofen (ADVIL,MOTRIN) 600 MG tablet Take 600 mg by mouth 3 (three) times daily as needed for Pain.     INVOKANA 300 mg Tab tablet Take 300 mg by mouth once daily.     metFORMIN (GLUCOPHAGE) 1000 MG tablet Take 1,000 mg by mouth 2 (two) times daily with meals.     rivaroxaban (XARELTO) 20 mg Tab Take 1 tablet (20 mg total) by mouth daily with dinner or evening meal.    TRULICITY 0.75 mg/0.5 mL PnIj     VASCEPA 1 gram Cap Take 1 g by mouth 4 (four) times daily.     VOLTAREN 1 % Gel     [DISCONTINUED] glipiZIDE (GLUCOTROL) 10 MG tablet glipizide 10 mg tablet   Take 1 tablet every day by oral route.    [DISCONTINUED] hydrocortisone 2.5 % cream     [DISCONTINUED] lisinopril-hydrochlorothiazide (PRINZIDE,ZESTORETIC) 20-12.5 mg per tablet lisinopril 20 mg-hydrochlorothiazide 12.5 mg tablet   Take 1 tablet every day by oral route.     Family History     Problem Relation (Age of Onset)    Cancer Father        Tobacco Use    Smoking status: Never Smoker    Smokeless tobacco: Never Used   Substance and Sexual Activity    Alcohol use: Never     Frequency: Never    Drug use: Never    Sexual activity: Not on file     Review of Systems   Constitutional: Positive for chills, fatigue and fever. Negative for activity change and diaphoresis.   HENT: Negative  for congestion, ear pain and facial swelling.    Eyes: Negative for photophobia and visual disturbance.   Respiratory: Positive for shortness of breath. Negative for cough.    Cardiovascular: Negative for chest pain and leg swelling.   Gastrointestinal: Positive for diarrhea and nausea. Negative for abdominal distention, abdominal pain and constipation.   Endocrine: Negative for polyphagia and polyuria.   Genitourinary: Negative for dysuria, flank pain and hematuria.   Musculoskeletal: Negative for arthralgias and back pain.   Skin: Negative for color change and rash.   Neurological: Negative for speech difficulty and numbness.   Psychiatric/Behavioral: Negative for agitation, behavioral problems and dysphoric mood.     Objective:     Vital Signs (Most Recent):  Temp: 98.8 °F (37.1 °C) (07/30/20 1108)  Pulse: 63 (07/30/20 1426)  Resp: 18 (07/30/20 1108)  BP: (!) 147/116 (07/30/20 1402)  SpO2: 95 % (07/30/20 1426) Vital Signs (24h Range):  Temp:  [98.8 °F (37.1 °C)] 98.8 °F (37.1 °C)  Pulse:  [63-68] 63  Resp:  [18] 18  SpO2:  [93 %-100 %] 95 %  BP: (125-159)/() 147/116        There is no height or weight on file to calculate BMI.    Physical Exam  Vitals signs and nursing note reviewed.   Constitutional:       General: She is not in acute distress.     Appearance: Normal appearance. She is well-developed and normal weight.   HENT:      Head: Normocephalic and atraumatic.      Right Ear: External ear normal.      Left Ear: External ear normal.      Nose: Nose normal.      Mouth/Throat:      Mouth: Mucous membranes are moist.   Eyes:      Conjunctiva/sclera: Conjunctivae normal.      Pupils: Pupils are equal, round, and reactive to light.   Neck:      Musculoskeletal: Normal range of motion and neck supple.   Cardiovascular:      Rate and Rhythm: Normal rate and regular rhythm.      Heart sounds: Normal heart sounds. No murmur.   Pulmonary:      Effort: Pulmonary effort is normal.      Breath sounds: Rhonchi  present.      Comments: On 2 L nasal cannula.  Decreased at the base.  Abdominal:      General: Bowel sounds are normal.      Palpations: Abdomen is soft.      Tenderness: There is no abdominal tenderness.   Musculoskeletal: Normal range of motion.         General: No swelling.   Skin:     General: Skin is warm and dry.      Coloration: Skin is not jaundiced.   Neurological:      General: No focal deficit present.      Mental Status: She is alert and oriented to person, place, and time. Mental status is at baseline.   Psychiatric:         Behavior: Behavior normal.         Judgment: Judgment normal.           CRANIAL NERVES     CN III, IV, VI   Pupils are equal, round, and reactive to light.       Significant Labs:   BMP:   Recent Labs   Lab 07/30/20  1136   *      K 3.9      CO2 25   BUN 18   CREATININE 0.7   CALCIUM 8.6*     CBC:   Recent Labs   Lab 07/30/20  1136   WBC 4.64   HGB 13.9   HCT 43.1          Significant Imaging: I have reviewed and interpreted all pertinent imaging results/findings within the past 24 hours.

## 2020-07-30 NOTE — HPI
Tamica Sherman is a 70 y.o. female with past medical history of diabetes mellitus type 2, hypertension, hyperlipidemia and deep vein thrombosis on oral anticoagulant who presents to the ED for evaluation of worsening of shortness of breath, generalized fatigue, diarrhea and headache which is progressively worsened over the last week.  She was diagnosed with COVID-19 on 07/23/2020 at the COVID clinic.  She also reports  The patient tested positive for COVID-19 on 7/23/2020 at the Covid Clinic.  According to her daughter she is had poor oral intake.  She denies fever and chest pain.   Her  tested positive for COVID-19  And will be admitted to .     Chest x-ray consistent with COVID-19 pneumonitis.     The patient's history is limited secondary to language barrier.  Interpretation provided by her daughter per request.

## 2020-07-30 NOTE — ASSESSMENT & PLAN NOTE
- COVID-19 testing Collection Date: 7/23/2020 Collection Time:  12:18 PM   - Infection Control notified     - Isolation:   - Airborne, Contact and Droplet Precautions  - Cohort patients into COVID units              - Limit visitors per hospital policy              - Consolidating lab draws, nursing care, provider visits, and interventions      - Management:  Supplemental O2 to maintain SpO2 >92%  Continuous/intermittent Pulse Ox  Albuterol treatment PRN    Advance Care Planning   Patient full resuscitation.

## 2020-07-30 NOTE — ASSESSMENT & PLAN NOTE
Patient is chronically on statin.will continue for now. Monitor clinically. Last LDL was No results found for: LDLCALC

## 2020-07-30 NOTE — ED PROVIDER NOTES
Encounter Date: 7/30/2020    SCRIBE #1 NOTE: I, More Luciano, am scribing for, and in the presence of, Dr. Carrillo.       History     Chief Complaint   Patient presents with    Shortness of Breath     dx with COVID 1 week ago. Feeling more SOB and fatigued.     COVID-19 Concerns     7/30/2020  12:18 PM     The patient is a 70 y.o. female  who presents with sob. The patient tested positive for COVID-19 on 7/23/2020 at the [facility]. Patient had sob, fatigue and headache at that time. Patient c/o gradual onset of worsening shortness of breath and fatigue which has been constant over the past 7 days. She also c/o diarrhea, muscle aches and dehydration. She has not been drinking much fluids. She denies any chest pain, cough, vomiting or abdominal pain. Her  tested positive for COVID-19 as well. PMHx of DM, HTN, HLD, DVT. No Shx.     The patient's history is limited secondary to language barrier and is provided by her daughter.    The history is provided by the patient and a relative. The history is limited by a language barrier. No  was used.     Review of patient's allergies indicates:  No Known Allergies  Past Medical History:   Diagnosis Date    Acute deep vein thrombosis (DVT) of left peroneal vein 1/6/2019    Anticardiolipin antibody positive 3/31/2019    Compound heterozygous MTHFR mutation [id number] 4/1/2019    Diabetes mellitus     High cholesterol     Hypertension      History reviewed. No pertinent surgical history.  Family History   Problem Relation Age of Onset    Cancer Father      Social History     Tobacco Use    Smoking status: Never Smoker    Smokeless tobacco: Never Used   Substance Use Topics    Alcohol use: Never     Frequency: Never    Drug use: Never     Review of Systems   Constitutional: Positive for appetite change and fatigue. Negative for chills and fever.   HENT: Negative for congestion, rhinorrhea and sore throat.    Respiratory: Positive for  shortness of breath. Negative for cough.    Cardiovascular: Negative for chest pain.   Gastrointestinal: Positive for diarrhea. Negative for abdominal pain, nausea and vomiting.   Genitourinary: Negative for dysuria.   Musculoskeletal: Positive for myalgias. Negative for back pain.   Skin: Negative for rash.   Neurological: Positive for headaches. Negative for weakness and numbness.   Hematological: Does not bruise/bleed easily.       Physical Exam     Initial Vitals [07/30/20 1108]   BP Pulse Resp Temp SpO2   125/68 65 18 98.8 °F (37.1 °C) (!) 93 %      MAP       --         Physical Exam    Nursing note and vitals reviewed.  Constitutional: No distress.   Appears fatigue and ill.   HENT:   Head: Normocephalic and atraumatic.   Mouth/Throat: Mucous membranes are normal.   Eyes: EOM are normal. Pupils are equal, round, and reactive to light.   Neck: Normal range of motion.   Cardiovascular: Normal rate, regular rhythm, normal heart sounds and intact distal pulses. Exam reveals no gallop and no friction rub.    No murmur heard.  Pulmonary/Chest: No respiratory distress. She has no wheezes. She has no rhonchi. She has rales. She exhibits no tenderness.   Rales to bilateral lungs.   Abdominal: She exhibits no distension. There is no abdominal tenderness.   Musculoskeletal: Normal range of motion. No edema.   Neurological: She is alert and oriented to person, place, and time. She has normal strength.   Skin: Skin is dry. No rash noted. No erythema.   Psychiatric: She has a normal mood and affect.         ED Course   Procedures  Labs Reviewed   CBC W/ AUTO DIFFERENTIAL - Abnormal; Notable for the following components:       Result Value    Mean Corpuscular Hemoglobin 31.2 (*)     All other components within normal limits   COMPREHENSIVE METABOLIC PANEL - Abnormal; Notable for the following components:    Glucose 136 (*)     Calcium 8.6 (*)     Albumin 3.0 (*)     All other components within normal limits   C-REACTIVE  PROTEIN - Abnormal; Notable for the following components:    CRP 13.3 (*)     All other components within normal limits   FERRITIN - Abnormal; Notable for the following components:    Ferritin 768 (*)     All other components within normal limits   LACTATE DEHYDROGENASE - Abnormal; Notable for the following components:     (*)     All other components within normal limits   CK   LACTIC ACID, PLASMA   TROPONIN I     EKG Readings: (Independently Interpreted)   sinus rhythm with rate of 67. Sinus arrhythmia. No STEMI.       Imaging Results           X-Ray Chest AP Portable (Final result)  Result time 07/30/20 11:51:08   Notes recorded by Johnnie Norton MD on 7/30/2020 at 5:55 PM CDT  I believe she has covid - forward this to the attention of her PCP     Final result by Ara Patel MD (07/30/20 11:51:08)                 Impression:      There is patchy opacification of bilateral mid and lower lung zones concerning for pneumonia.  This report was flagged in Epic as abnormal.  .      Electronically signed by: Ara Patel MD  Date:    07/30/2020  Time:    11:51             Narrative:    EXAMINATION:  XR CHEST AP PORTABLE    CLINICAL HISTORY:  Suspected Covid-19 Virus Infection;    TECHNIQUE:  Single frontal view of the chest was performed.    COMPARISON:  None    FINDINGS:  There is patchy opacification of bilateral mid and lower lung zones.                                 Medical Decision Making:   History:   Old Medical Records: I decided to obtain old medical records.  Initial Assessment:   Patient with COVID-19 infection and worsening shortness of breath with hypoxia on ambulation.  She has bilateral pneumonia and is started empirically on Rocephin azithromycin.  Inflammatory markers are elevated.  Discussed with Hospital Medicine who agrees to admit the patient for oxygen supplementation and further treatment.  Independently Interpreted Test(s):   I have ordered and independently interpreted EKG  Reading(s) - see prior notes  Clinical Tests:   Lab Tests: Ordered and Reviewed  Radiological Study: Reviewed and Ordered  Medical Tests: Reviewed and Ordered            Scribe Attestation:   Scribe #1: I performed the above scribed service and the documentation accurately describes the services I performed. I attest to the accuracy of the note.     I, Gerardo Doyle, personally performed the services described in this documentation. All medical record entries made by the scribe were at my direction and in my presence.  I have reviewed the chart and agree that the record reflects my personal performance and is accurate and complete. Noah Carrillo MD.                      Clinical Impression:       ICD-10-CM ICD-9-CM   1. Pneumonia of both lungs due to infectious organism, unspecified part of lung  J18.9 483.8   2. Suspected Covid-19 Virus Infection  R68.89    3. COVID-19 virus infection  U07.1    4. Hypoxia  R09.02 799.02   5. Ruled out for myocardial infarction  Z03.89 V71.7             ED Disposition Condition    Admit                           Noah Carrillo MD  07/30/20 1913

## 2020-07-30 NOTE — H&P
[facility] Hospital Medicine  History & Physical    Patient Name: Tamica Sherman  MRN: [id number]  Admission Date: 7/30/2020  Attending Physician: Karlee Jimenez NP  Primary Care Provider: Oziel Campos MD         Patient information was obtained from patient, relative(s) and ER records.     Subjective:     Principal Problem:Pneumonia due to COVID-19 virus    Chief Complaint:   Chief Complaint   Patient presents with    Shortness of Breath     dx with COVID 1 week ago. Feeling more SOB and fatigued.     COVID-19 Concerns        HPI: Tamica Sherman is a 70 y.o. female with past medical history of diabetes mellitus type 2, hypertension, hyperlipidemia and deep vein thrombosis on oral anticoagulant who presents to the ED for evaluation of worsening of shortness of breath, generalized fatigue, diarrhea and headache which is progressively worsened over the last week.  She was diagnosed with COVID-19 on 07/23/2020 at the COVID clinic.  She also reports  The patient tested positive for COVID-19 on 7/23/2020 at the [facility].  According to her daughter she is had poor oral intake.  She denies fever and chest pain.   Her  tested positive for COVID-19  And will be admitted to .     Chest x-ray consistent with COVID-19 pneumonitis.     The patient's history is limited secondary to language barrier.  Interpretation provided by her daughter per request.        Past Medical History:   Diagnosis Date    Acute deep vein thrombosis (DVT) of left peroneal vein 1/6/2019    Anticardiolipin antibody positive 3/31/2019    Compound heterozygous MTHFR mutation [id number] 4/1/2019    Diabetes mellitus     High cholesterol     Hypertension        History reviewed. No pertinent surgical history.    Review of patient's allergies indicates:  No Known Allergies    No current facility-administered medications on file prior to encounter.      Current Outpatient Medications on File Prior to Encounter   Medication  Sig    folic acid-vit B6-vit B12 2.5-25-2 mg (FOLBIC OR EQUIV) 2.5-25-2 mg Tab Take 1 tablet by mouth once daily.    ibuprofen (ADVIL,MOTRIN) 600 MG tablet Take 600 mg by mouth 3 (three) times daily as needed for Pain.     INVOKANA 300 mg Tab tablet Take 300 mg by mouth once daily.     metFORMIN (GLUCOPHAGE) 1000 MG tablet Take 1,000 mg by mouth 2 (two) times daily with meals.     rivaroxaban (XARELTO) 20 mg Tab Take 1 tablet (20 mg total) by mouth daily with dinner or evening meal.    TRULICITY 0.75 mg/0.5 mL PnIj     VASCEPA 1 gram Cap Take 1 g by mouth 4 (four) times daily.     VOLTAREN 1 % Gel     [DISCONTINUED] glipiZIDE (GLUCOTROL) 10 MG tablet glipizide 10 mg tablet   Take 1 tablet every day by oral route.    [DISCONTINUED] hydrocortisone 2.5 % cream     [DISCONTINUED] lisinopril-hydrochlorothiazide (PRINZIDE,ZESTORETIC) 20-12.5 mg per tablet lisinopril 20 mg-hydrochlorothiazide 12.5 mg tablet   Take 1 tablet every day by oral route.     Family History     Problem Relation (Age of Onset)    Cancer Father        Tobacco Use    Smoking status: Never Smoker    Smokeless tobacco: Never Used   Substance and Sexual Activity    Alcohol use: Never     Frequency: Never    Drug use: Never    Sexual activity: Not on file     Review of Systems   Constitutional: Positive for chills, fatigue and fever. Negative for activity change and diaphoresis.   HENT: Negative for congestion, ear pain and facial swelling.    Eyes: Negative for photophobia and visual disturbance.   Respiratory: Positive for shortness of breath. Negative for cough.    Cardiovascular: Negative for chest pain and leg swelling.   Gastrointestinal: Positive for diarrhea and nausea. Negative for abdominal distention, abdominal pain and constipation.   Endocrine: Negative for polyphagia and polyuria.   Genitourinary: Negative for dysuria, flank pain and hematuria.   Musculoskeletal: Negative for arthralgias and back pain.   Skin: Negative for  color change and rash.   Neurological: Negative for speech difficulty and numbness.   Psychiatric/Behavioral: Negative for agitation, behavioral problems and dysphoric mood.     Objective:     Vital Signs (Most Recent):  Temp: 98.8 °F (37.1 °C) (07/30/20 1108)  Pulse: 63 (07/30/20 1426)  Resp: 18 (07/30/20 1108)  BP: (!) 147/116 (07/30/20 1402)  SpO2: 95 % (07/30/20 1426) Vital Signs (24h Range):  Temp:  [98.8 °F (37.1 °C)] 98.8 °F (37.1 °C)  Pulse:  [63-68] 63  Resp:  [18] 18  SpO2:  [93 %-100 %] 95 %  BP: (125-159)/() 147/116        There is no height or weight on file to calculate BMI.    Physical Exam  Vitals signs and nursing note reviewed.   Constitutional:       General: She is not in acute distress.     Appearance: Normal appearance. She is well-developed and normal weight.   HENT:      Head: Normocephalic and atraumatic.      Right Ear: External ear normal.      Left Ear: External ear normal.      Nose: Nose normal.      Mouth/Throat:      Mouth: Mucous membranes are moist.   Eyes:      Conjunctiva/sclera: Conjunctivae normal.      Pupils: Pupils are equal, round, and reactive to light.   Neck:      Musculoskeletal: Normal range of motion and neck supple.   Cardiovascular:      Rate and Rhythm: Normal rate and regular rhythm.      Heart sounds: Normal heart sounds. No murmur.   Pulmonary:      Effort: Pulmonary effort is normal.      Breath sounds: Rhonchi present.      Comments: On 2 L nasal cannula.  Decreased at the base.  Abdominal:      General: Bowel sounds are normal.      Palpations: Abdomen is soft.      Tenderness: There is no abdominal tenderness.   Musculoskeletal: Normal range of motion.         General: No swelling.   Skin:     General: Skin is warm and dry.      Coloration: Skin is not jaundiced.   Neurological:      General: No focal deficit present.      Mental Status: She is alert and oriented to person, place, and time. Mental status is at baseline.   Psychiatric:         Behavior:  Behavior normal.         Judgment: Judgment normal.           CRANIAL NERVES     CN III, IV, VI   Pupils are equal, round, and reactive to light.       Significant Labs:   BMP:   Recent Labs   Lab 07/30/20  1136   *      K 3.9      CO2 25   BUN 18   CREATININE 0.7   CALCIUM 8.6*     CBC:   Recent Labs   Lab 07/30/20  1136   WBC 4.64   HGB 13.9   HCT 43.1          Significant Imaging: I have reviewed and interpreted all pertinent imaging results/findings within the past 24 hours.    Assessment/Plan:     * Pneumonia due to COVID-19 virus  - COVID-19 testing Collection Date: 7/23/2020 Collection Time:  12:18 PM   - Infection Control notified     - Isolation:   - Airborne, Contact and Droplet Precautions  - Cohort patients into COVID units              - Limit visitors per hospital policy              - Consolidating lab draws, nursing care, provider visits, and interventions      - Management:  Supplemental O2 to maintain SpO2 >92%  Continuous/intermittent Pulse Ox  Albuterol treatment PRN    Advance Care Planning   Patient full resuscitation.           Hyperlipidemia associated with type 2 diabetes mellitus   Patient is chronically on statin.will continue for now. Monitor clinically. Last LDL was No results found for: LDLCALC         Type 2 diabetes mellitus, with long-term current use of insulin  Patient's FSGs are controlled on current hypoglycemics.   Last A1c reviewed- No results found for: LABA1C, HGBA1C  Most recent fingerstick glucose reviewed- No results for input(s): POCTGLUCOSE in the last 24 hours.  Current correctional scale  Low  Maintain anti-hyperglycemic dose as follows-   Antihyperglycemics (From admission, onward)    None        Hold Oral hypoglycemics while patient is in the hospital.        Hypertension associated with type 2 diabetes mellitus  Chronic, controlled.  Latest blood pressure and vitals reviewed-   Temp:  [97.4 °F (36.3 °C)-98.8 °F (37.1 °C)]   Pulse:  [62-68]    Resp:  [18-20]   BP: (125-197)/()   SpO2:  [93 %-100 %] .   Home meds for hypertension were reviewed and noted below. Hospital anti-hypertensive changes were made as shown below.  Hypertension Medications         Will utilize p.r.n. blood pressure medication only if patient's blood pressure greater than  180/110 and she develops symptoms such as worsening chest pain or shortness of breath.        Compound heterozygous MTHFR mutation [id number]  With history of DVT.  Resume home Rivaroxaban        VTE Risk Mitigation (From admission, onward)    None             Karlee Jimenez NP  Department of Hospital Medicine   [facility]

## 2020-07-30 NOTE — NURSING
Report received from BLAKE Pruitt. Pt arrived to room 306 via wheelchair. Pt AAOx4. Understands simple English. No distress noted. Reports pain 0/10 at this time. O2 @ 2 L via nasal cannula-- 99%.  Afebrile. Bed locked and low. Call light within reach. Will continue to monitor and carry out appropriate transfer orders.

## 2020-07-30 NOTE — NURSING
Karlee Jimenez, NP notified of elevated BP. She stated she will look through chart and place appropriate orders.

## 2020-07-30 NOTE — ASSESSMENT & PLAN NOTE
Chronic, controlled.  Latest blood pressure and vitals reviewed-   Temp:  [97.4 °F (36.3 °C)-98.8 °F (37.1 °C)]   Pulse:  [62-68]   Resp:  [18-20]   BP: (125-197)/()   SpO2:  [93 %-100 %] .   Home meds for hypertension were reviewed and noted below. Hospital anti-hypertensive changes were made as shown below.  Hypertension Medications         Will utilize p.r.n. blood pressure medication only if patient's blood pressure greater than  180/110 and she develops symptoms such as worsening chest pain or shortness of breath.

## 2020-07-30 NOTE — ED NOTES
Presents to ER afte Dx COVID last week   Increased sob, fatigue, coughing over last couple days   + diarrhea    Noted labored breathing with movements, O2 93% on RA. 2 L NC applied with increase to 98%  Noted very fatigued but easy to arouse   Skin warm and dry     IV placed.   Blood work sent  On monitor

## 2020-07-30 NOTE — ASSESSMENT & PLAN NOTE
Patient's FSGs are controlled on current hypoglycemics.   Last A1c reviewed- No results found for: LABA1C, HGBA1C  Most recent fingerstick glucose reviewed- No results for input(s): POCTGLUCOSE in the last 24 hours.  Current correctional scale  Low  Maintain anti-hyperglycemic dose as follows-   Antihyperglycemics (From admission, onward)    None        Hold Oral hypoglycemics while patient is in the hospital.

## 2020-07-31 ENCOUNTER — NURSE TRIAGE (OUTPATIENT)
Dept: ADMINISTRATIVE | Facility: CLINIC | Age: 70
End: 2020-07-31

## 2020-07-31 LAB
25(OH)D3+25(OH)D2 SERPL-MCNC: 11 NG/ML (ref 30–96)
BILIRUB UR QL STRIP: NEGATIVE
CLARITY UR: CLEAR
COLOR UR: YELLOW
GLUCOSE UR QL STRIP: NEGATIVE
HGB UR QL STRIP: NEGATIVE
KETONES UR QL STRIP: NEGATIVE
LEUKOCYTE ESTERASE UR QL STRIP: NEGATIVE
NITRITE UR QL STRIP: NEGATIVE
PH UR STRIP: 7 [PH] (ref 5–8)
PROT UR QL STRIP: NEGATIVE
SP GR UR STRIP: 1.02 (ref 1–1.03)
URN SPEC COLLECT METH UR: NORMAL
UROBILINOGEN UR STRIP-ACNC: NEGATIVE EU/DL

## 2020-07-31 PROCEDURE — 27000221 HC OXYGEN, UP TO 24 HOURS

## 2020-07-31 PROCEDURE — 94761 N-INVAS EAR/PLS OXIMETRY MLT: CPT

## 2020-07-31 PROCEDURE — 25000003 PHARM REV CODE 250: Performed by: NURSE PRACTITIONER

## 2020-07-31 PROCEDURE — 81003 URINALYSIS AUTO W/O SCOPE: CPT

## 2020-07-31 PROCEDURE — 12000002 HC ACUTE/MED SURGE SEMI-PRIVATE ROOM

## 2020-07-31 PROCEDURE — 63600175 PHARM REV CODE 636 W HCPCS: Performed by: NURSE PRACTITIONER

## 2020-07-31 RX ORDER — ONDANSETRON 4 MG/1
4 TABLET, ORALLY DISINTEGRATING ORAL ONCE
Status: COMPLETED | OUTPATIENT
Start: 2020-07-31 | End: 2020-07-31

## 2020-07-31 RX ORDER — FERROUS SULFATE, DRIED 160(50) MG
1 TABLET, EXTENDED RELEASE ORAL DAILY
Status: DISCONTINUED | OUTPATIENT
Start: 2020-07-31 | End: 2020-08-01 | Stop reason: HOSPADM

## 2020-07-31 RX ORDER — CLONIDINE HYDROCHLORIDE 0.1 MG/1
0.1 TABLET ORAL EVERY 6 HOURS PRN
Status: DISCONTINUED | OUTPATIENT
Start: 2020-07-31 | End: 2020-08-01 | Stop reason: HOSPADM

## 2020-07-31 RX ADMIN — DEXAMETHASONE 6 MG: 4 TABLET ORAL at 08:07

## 2020-07-31 RX ADMIN — AZITHROMYCIN 250 MG: 250 TABLET, FILM COATED ORAL at 08:07

## 2020-07-31 RX ADMIN — AMLODIPINE BESYLATE 5 MG: 5 TABLET ORAL at 08:07

## 2020-07-31 RX ADMIN — RIVAROXABAN 20 MG: 20 TABLET, FILM COATED ORAL at 04:07

## 2020-07-31 RX ADMIN — Medication 500 MG: at 08:07

## 2020-07-31 RX ADMIN — ONDANSETRON 4 MG: 4 TABLET, ORALLY DISINTEGRATING ORAL at 03:07

## 2020-07-31 RX ADMIN — Medication 500 MG: at 09:07

## 2020-07-31 RX ADMIN — CEFTRIAXONE 1 G: 1 INJECTION, SOLUTION INTRAVENOUS at 04:07

## 2020-07-31 RX ADMIN — Medication 1 TABLET: at 09:07

## 2020-07-31 RX ADMIN — FOLIC ACID-PYRIDOXINE-CYANOCOBALAMIN TAB 2.5-25-2 MG 1 TABLET: 2.5-25-2 TAB at 08:07

## 2020-07-31 NOTE — PLAN OF CARE
Patient AAOx4.   VSS, afebrile.  Tele # [id number] monitored.   O2 at 2L NC with sats around 96%.  PRN clonidine ordered.  Ambulatory to BR with standby assist.  Moderate language barrier; spouse also a patient on unit in B bed.  U/A to be obtained.  Airborne/droplet/contact precautions maintained d/t Covid dx.  No complaints noted at this time.  Needs addressed, safety maintained.  Monitoring.

## 2020-07-31 NOTE — PROGRESS NOTES
[facility] Hospital Medicine  Progress Note    Patient Name: Tamica Sherman  MRN: [id number]  Patient Class: IP- Inpatient   Admission Date: 7/30/2020  Length of Stay: 1 days  Attending Physician: David Mendez MD  Primary Care Provider: Oziel Campos MD        Subjective:     Principal Problem:Pneumonia due to COVID-19 virus        HPI:  Tamica Sherman is a 70 y.o. female with past medical history of diabetes mellitus type 2, hypertension, hyperlipidemia and deep vein thrombosis on oral anticoagulant who presents to the ED for evaluation of worsening of shortness of breath, generalized fatigue, diarrhea and headache which is progressively worsened over the last week.  She was diagnosed with COVID-19 on 07/23/2020 at the COVID clinic.  She also reports  The patient tested positive for COVID-19 on 7/23/2020 at the [facility].  According to her daughter she is had poor oral intake.  She denies fever and chest pain.   Her  tested positive for COVID-19  And will be admitted to .     Chest x-ray consistent with COVID-19 pneumonitis.     The patient's history is limited secondary to language barrier.  Interpretation provided by her daughter per request.        Overview/Hospital Course:  No notes on file    Interval History:  Still with intermittent diarrhea although is improving.  Denies shortness of breath she is presently on room air with oxygen saturation 93%.  D-dimer 17.2 she is currently on oral anticoagulant.    Review of Systems   Constitutional: Positive for activity change and fatigue. Negative for diaphoresis and fever.   Respiratory: Positive for cough.    Cardiovascular: Negative for chest pain and leg swelling.   Gastrointestinal: Positive for diarrhea and nausea. Negative for abdominal distention and abdominal pain.   Skin: Negative for rash.   Neurological: Negative for speech difficulty and numbness.     Objective:     Vital Signs (Most Recent):  Temp: 96.3 °F (35.7 °C) (07/31/20  0735)  Pulse: 66 (07/31/20 0835)  Resp: 18 (07/31/20 0835)  BP: (!) 150/72 (07/31/20 0735)  SpO2: (!) 93 % (07/31/20 0835) Vital Signs (24h Range):  Temp:  [96.3 °F (35.7 °C)-98 °F (36.7 °C)] 96.3 °F (35.7 °C)  Pulse:  [60-66] 66  Resp:  [16-20] 18  SpO2:  [93 %-99 %] 93 %  BP: (147-197)/() 150/72     Weight: 58.5 kg (128 lb 15.5 oz)  Body mass index is 24.37 kg/m².    Intake/Output Summary (Last 24 hours) at 7/31/2020 1246  Last data filed at 7/31/2020 0600  Gross per 24 hour   Intake 1450 ml   Output --   Net 1450 ml      Physical Exam  Vitals signs and nursing note reviewed.   Constitutional:       Appearance: Normal appearance. She is well-developed.   HENT:      Head: Normocephalic and atraumatic.   Neck:      Musculoskeletal: Normal range of motion.   Cardiovascular:      Rate and Rhythm: Normal rate and regular rhythm.      Heart sounds: Normal heart sounds.   Pulmonary:      Effort: Pulmonary effort is normal.   Musculoskeletal: Normal range of motion.   Skin:     General: Skin is warm and dry.   Neurological:      General: No focal deficit present.      Mental Status: She is alert and oriented to person, place, and time.   Psychiatric:         Behavior: Behavior normal.         Judgment: Judgment normal.         Significant Labs:   BMP:   Recent Labs   Lab 07/30/20  1136   *      K 3.9      CO2 25   BUN 18   CREATININE 0.7   CALCIUM 8.6*   MG 2.1     CBC:   Recent Labs   Lab 07/30/20  1136   WBC 4.64   HGB 13.9   HCT 43.1          Significant Imaging: I have reviewed and interpreted all pertinent imaging results/findings within the past 24 hours.      Assessment/Plan:      * Pneumonia due to COVID-19 virus  - COVID-19 testing Collection Date: 7/23/2020 Collection Time:  12:18 PM   - Infection Control notified     - Isolation:   - Airborne, Contact and Droplet Precautions  - Cohort patients into COVID units              - Limit visitors per hospital policy              -  Consolidating lab draws, nursing care, provider visits, and interventions      - Management:  Supplemental O2 to maintain SpO2 >92%  Continuous/intermittent Pulse Ox  Albuterol treatment PRN    Advance Care Planning   Patient full resuscitation.           Hyperlipidemia associated with type 2 diabetes mellitus   Patient is chronically on statin.will continue for now. Monitor clinically. Last LDL was No results found for: LDLCALC         Type 2 diabetes mellitus, with long-term current use of insulin  Patient's FSGs are controlled on current hypoglycemics.   Last A1c reviewed- No results found for: LABA1C, HGBA1C  Most recent fingerstick glucose reviewed- No results for input(s): POCTGLUCOSE in the last 24 hours.  Current correctional scale  Low  Maintain anti-hyperglycemic dose as follows-   Antihyperglycemics (From admission, onward)    None        Hold Oral hypoglycemics while patient is in the hospital.        Hypertension associated with type 2 diabetes mellitus  Chronic, controlled.  Latest blood pressure and vitals reviewed-   Temp:  [97.4 °F (36.3 °C)-98.8 °F (37.1 °C)]   Pulse:  [62-68]   Resp:  [18-20]   BP: (125-197)/()   SpO2:  [93 %-100 %] .   Home meds for hypertension were reviewed and noted below. Hospital anti-hypertensive changes were made as shown below.  Hypertension Medications         Will utilize p.r.n. blood pressure medication only if patient's blood pressure greater than  180/110 and she develops symptoms such as worsening chest pain or shortness of breath.        Compound heterozygous MTHFR mutation [id number]  With history of DVT.  Resume home Rivaroxaban        VTE Risk Mitigation (From admission, onward)         Ordered     rivaroxaban tablet 20 mg  With dinner      07/30/20 1642              Continue plan of care.  Daughter request glucometer and strips for home.        Karlee Jimenez NP  Department of Hospital Medicine   [facility]

## 2020-07-31 NOTE — PLAN OF CARE
Dawood completed the assessment via phone with daughter JOVANI.  She informed me that her parents do not speak much english.  ( in 306b)  PCP is Dr. Campos.  Insurance verified as Medicare/Medicaid.  Pt is a diabetic and on Xarelto.  Daughter informed me that pt does not have a glucometer.  Cm informed Nuvia, RN-Diabetic Educator.  Disposition:  Pt will discharge to home with family.  [facility].       07/31/20 0806   Discharge Assessment   Assessment Type Discharge Planning Assessment   Confirmed/corrected address and phone number on facesheet? Yes   Assessment information obtained from? Caregiver   Expected Length of Stay (days) 2   Communicated expected length of stay with patient/caregiver yes   Prior to hospitilization cognitive status: Alert/Oriented   Prior to hospitalization functional status: Independent   Current cognitive status: Alert/Oriented   Current Functional Status: Independent   Facility Arrived From: home   Lives With spouse   Able to Return to Prior Arrangements yes   Is patient able to care for self after discharge? Yes   Who are your caregiver(s) and their phone number(s)? daughter JOVANI OLIVA- [phone]   Patient's perception of discharge disposition home or selfcare   Readmission Within the Last 30 Days no previous admission in last 30 days   Patient currently being followed by outpatient case management? No   Equipment Currently Used at Home none   Do you have any problems affording any of your prescribed medications? No   Is the patient taking medications as prescribed? yes   Does the patient have transportation home? Yes   Transportation Anticipated family or friend will provide   Dialysis Name and Scheduled days na   Does the patient receive services at the Coumadin Clinic? No  (Pt is on Xarelto)   Discharge Plan A Home with family   Discharge Plan B Home with family   DME Needed Upon Discharge  glucometer   Patient/Family in Agreement with Plan yes

## 2020-07-31 NOTE — SUBJECTIVE & OBJECTIVE
Interval History:  Still with intermittent diarrhea although is improving.  Denies shortness of breath she is presently on room air with oxygen saturation 93%.  D-dimer 17.2 she is currently on oral anticoagulant.    Review of Systems   Constitutional: Positive for activity change and fatigue. Negative for diaphoresis and fever.   Respiratory: Positive for cough.    Cardiovascular: Negative for chest pain and leg swelling.   Gastrointestinal: Positive for diarrhea and nausea. Negative for abdominal distention and abdominal pain.   Skin: Negative for rash.   Neurological: Negative for speech difficulty and numbness.     Objective:     Vital Signs (Most Recent):  Temp: 96.3 °F (35.7 °C) (07/31/20 0735)  Pulse: 66 (07/31/20 0835)  Resp: 18 (07/31/20 0835)  BP: (!) 150/72 (07/31/20 0735)  SpO2: (!) 93 % (07/31/20 0835) Vital Signs (24h Range):  Temp:  [96.3 °F (35.7 °C)-98 °F (36.7 °C)] 96.3 °F (35.7 °C)  Pulse:  [60-66] 66  Resp:  [16-20] 18  SpO2:  [93 %-99 %] 93 %  BP: (147-197)/() 150/72     Weight: 58.5 kg (128 lb 15.5 oz)  Body mass index is 24.37 kg/m².    Intake/Output Summary (Last 24 hours) at 7/31/2020 1246  Last data filed at 7/31/2020 0600  Gross per 24 hour   Intake 1450 ml   Output --   Net 1450 ml      Physical Exam  Vitals signs and nursing note reviewed.   Constitutional:       Appearance: Normal appearance. She is well-developed.   HENT:      Head: Normocephalic and atraumatic.   Neck:      Musculoskeletal: Normal range of motion.   Cardiovascular:      Rate and Rhythm: Normal rate and regular rhythm.      Heart sounds: Normal heart sounds.   Pulmonary:      Effort: Pulmonary effort is normal.   Musculoskeletal: Normal range of motion.   Skin:     General: Skin is warm and dry.   Neurological:      General: No focal deficit present.      Mental Status: She is alert and oriented to person, place, and time.   Psychiatric:         Behavior: Behavior normal.         Judgment: Judgment normal.          Significant Labs:   BMP:   Recent Labs   Lab 07/30/20  1136   *      K 3.9      CO2 25   BUN 18   CREATININE 0.7   CALCIUM 8.6*   MG 2.1     CBC:   Recent Labs   Lab 07/30/20  1136   WBC 4.64   HGB 13.9   HCT 43.1          Significant Imaging: I have reviewed and interpreted all pertinent imaging results/findings within the past 24 hours.

## 2020-07-31 NOTE — CARE UPDATE
07/31/20 0835   Patient Assessment/Suction   Level of Consciousness (AVPU) alert   PRE-TX-O2   O2 Device (Oxygen Therapy) nasal cannula   $ Is the patient on Low Flow Oxygen? Yes   Flow (L/min) 2   Oxygen Concentration (%) 28   SpO2 (!) 93 %   Pulse Oximetry Type Continuous   $ Pulse Oximetry - Multiple Charge Pulse Oximetry - Multiple   Pulse 66   Resp 18

## 2020-07-31 NOTE — DISCHARGE INSTRUCTIONS
Please review the diabetic diet information attached. Call the dietitian with any questions, [phone].

## 2020-07-31 NOTE — NURSING
Karlee Jimenez on unit making morning rounds stepped inside room asked mrs Sherman if she wanted an intreperter Mr Manzo her spouse verbalized no. Question repeated twice For an  Denied both times.

## 2020-07-31 NOTE — TELEPHONE ENCOUNTER
Covid-19 symptom tracker call back, no contact made. Patient already went to hospital and was admitted for treatment.     Reason for Disposition   Patient already left for the hospital/clinic    Additional Information   Negative: Caller has already spoken with the PCP (or office), and has no further questions   Negative: Caller has already spoken with another triager and has no further questions   Negative: Caller has already spoken with another triager or PCP (or office), and has further questions and triager able to answer questions.   Negative: Cell phone out of range. Phone number verified.   Negative: Second attempt to contact family AND no contact made. Phone number verified.   Negative: Wrong number reached. Phone number verified.   Negative: Message left with person in household   Negative: Message left on unidentified voice mail. Phone number verified.   Negative: Message left on identified voicemail   Negative: No answer.  First attempt to contact caller.  Follow-up call scheduled within 15 minutes.   Negative: Busy signal.  First attempt to contact caller.  Follow-up call scheduled within 15 minutes.    Protocols used: NO CONTACT OR DUPLICATE CONTACT CALL-A-OH

## 2020-08-01 VITALS
BODY MASS INDEX: 24.37 KG/M2 | SYSTOLIC BLOOD PRESSURE: 161 MMHG | OXYGEN SATURATION: 97 % | HEART RATE: 60 BPM | WEIGHT: 129 LBS | TEMPERATURE: 99 F | RESPIRATION RATE: 16 BRPM | DIASTOLIC BLOOD PRESSURE: 77 MMHG

## 2020-08-01 LAB
ALBUMIN SERPL BCP-MCNC: 3.3 G/DL (ref 3.5–5.2)
ALP SERPL-CCNC: 77 U/L (ref 55–135)
ALT SERPL W/O P-5'-P-CCNC: 27 U/L (ref 10–44)
ANION GAP SERPL CALC-SCNC: 11 MMOL/L (ref 8–16)
AST SERPL-CCNC: 21 U/L (ref 10–40)
BASOPHILS # BLD AUTO: 0.01 K/UL (ref 0–0.2)
BASOPHILS NFR BLD: 0.2 % (ref 0–1.9)
BILIRUB SERPL-MCNC: 0.4 MG/DL (ref 0.1–1)
BUN SERPL-MCNC: 16 MG/DL (ref 8–23)
CALCIUM SERPL-MCNC: 9.6 MG/DL (ref 8.7–10.5)
CHLORIDE SERPL-SCNC: 106 MMOL/L (ref 95–110)
CO2 SERPL-SCNC: 23 MMOL/L (ref 23–29)
CREAT SERPL-MCNC: 0.7 MG/DL (ref 0.5–1.4)
DIFFERENTIAL METHOD: ABNORMAL
EOSINOPHIL # BLD AUTO: 0 K/UL (ref 0–0.5)
EOSINOPHIL NFR BLD: 0.3 % (ref 0–8)
ERYTHROCYTE [DISTWIDTH] IN BLOOD BY AUTOMATED COUNT: 11.3 % (ref 11.5–14.5)
EST. GFR  (AFRICAN AMERICAN): >60 ML/MIN/1.73 M^2
EST. GFR  (NON AFRICAN AMERICAN): >60 ML/MIN/1.73 M^2
FERRITIN SERPL-MCNC: 813 NG/ML (ref 20–300)
GLUCOSE SERPL-MCNC: 140 MG/DL (ref 70–110)
HCT VFR BLD AUTO: 44.5 % (ref 37–48.5)
HGB BLD-MCNC: 14.6 G/DL (ref 12–16)
IMM GRANULOCYTES # BLD AUTO: 0.01 K/UL (ref 0–0.04)
IMM GRANULOCYTES NFR BLD AUTO: 0.2 % (ref 0–0.5)
LDH SERPL L TO P-CCNC: 283 U/L (ref 110–260)
LYMPHOCYTES # BLD AUTO: 1.5 K/UL (ref 1–4.8)
LYMPHOCYTES NFR BLD: 25 % (ref 18–48)
MCH RBC QN AUTO: 31 PG (ref 27–31)
MCHC RBC AUTO-ENTMCNC: 32.8 G/DL (ref 32–36)
MCV RBC AUTO: 95 FL (ref 82–98)
MONOCYTES # BLD AUTO: 0.4 K/UL (ref 0.3–1)
MONOCYTES NFR BLD: 6 % (ref 4–15)
NEUTROPHILS # BLD AUTO: 4.2 K/UL (ref 1.8–7.7)
NEUTROPHILS NFR BLD: 68.3 % (ref 38–73)
NRBC BLD-RTO: 0 /100 WBC
PLATELET # BLD AUTO: 209 K/UL (ref 150–350)
PMV BLD AUTO: 10.3 FL (ref 9.2–12.9)
POTASSIUM SERPL-SCNC: 4.1 MMOL/L (ref 3.5–5.1)
PROT SERPL-MCNC: 7.1 G/DL (ref 6–8.4)
RBC # BLD AUTO: 4.71 M/UL (ref 4–5.4)
SODIUM SERPL-SCNC: 140 MMOL/L (ref 136–145)
WBC # BLD AUTO: 6.15 K/UL (ref 3.9–12.7)

## 2020-08-01 PROCEDURE — 36415 COLL VENOUS BLD VENIPUNCTURE: CPT

## 2020-08-01 PROCEDURE — 80053 COMPREHEN METABOLIC PANEL: CPT

## 2020-08-01 PROCEDURE — 63600175 PHARM REV CODE 636 W HCPCS: Performed by: NURSE PRACTITIONER

## 2020-08-01 PROCEDURE — 63700000 PHARM REV CODE 250 ALT 637 W/O HCPCS: Performed by: NURSE PRACTITIONER

## 2020-08-01 PROCEDURE — 25000003 PHARM REV CODE 250: Performed by: NURSE PRACTITIONER

## 2020-08-01 PROCEDURE — 94761 N-INVAS EAR/PLS OXIMETRY MLT: CPT

## 2020-08-01 PROCEDURE — 27000221 HC OXYGEN, UP TO 24 HOURS

## 2020-08-01 PROCEDURE — 83615 LACTATE (LD) (LDH) ENZYME: CPT

## 2020-08-01 PROCEDURE — 85025 COMPLETE CBC W/AUTO DIFF WBC: CPT

## 2020-08-01 PROCEDURE — 82728 ASSAY OF FERRITIN: CPT

## 2020-08-01 RX ORDER — LANCETS
1 EACH MISCELLANEOUS
Qty: 100 EACH | Refills: 0 | Status: SHIPPED | OUTPATIENT
Start: 2020-08-01

## 2020-08-01 RX ORDER — IBUPROFEN 200 MG
100 CAPSULE ORAL
Qty: 100 EACH | Refills: 0 | Status: SHIPPED | OUTPATIENT
Start: 2020-08-01

## 2020-08-01 RX ORDER — INSULIN PUMP SYRINGE, 3 ML
EACH MISCELLANEOUS
Qty: 1 EACH | Refills: 0 | Status: SHIPPED | OUTPATIENT
Start: 2020-08-01 | End: 2021-08-01

## 2020-08-01 RX ORDER — AZITHROMYCIN 250 MG/1
250 TABLET, FILM COATED ORAL DAILY
Qty: 4 TABLET | Refills: 0 | Status: SHIPPED | OUTPATIENT
Start: 2020-08-02 | End: 2020-08-06

## 2020-08-01 RX ORDER — LISINOPRIL 10 MG/1
10 TABLET ORAL DAILY
Qty: 90 TABLET | Refills: 1 | Status: SHIPPED | OUTPATIENT
Start: 2020-08-01 | End: 2021-08-01

## 2020-08-01 RX ORDER — ACETAMINOPHEN 325 MG/1
650 TABLET ORAL EVERY 6 HOURS PRN
Status: DISCONTINUED | OUTPATIENT
Start: 2020-08-01 | End: 2020-08-01 | Stop reason: HOSPADM

## 2020-08-01 RX ORDER — ASCORBIC ACID 500 MG
500 TABLET ORAL 2 TIMES DAILY
COMMUNITY
Start: 2020-08-01

## 2020-08-01 RX ADMIN — AZITHROMYCIN 250 MG: 250 TABLET, FILM COATED ORAL at 08:08

## 2020-08-01 RX ADMIN — Medication 1 TABLET: at 08:08

## 2020-08-01 RX ADMIN — DEXAMETHASONE 6 MG: 4 TABLET ORAL at 08:08

## 2020-08-01 RX ADMIN — Medication 500 MG: at 08:08

## 2020-08-01 RX ADMIN — AMLODIPINE BESYLATE 5 MG: 5 TABLET ORAL at 08:08

## 2020-08-01 RX ADMIN — FOLIC ACID-PYRIDOXINE-CYANOCOBALAMIN TAB 2.5-25-2 MG 1 TABLET: 2.5-25-2 TAB at 08:08

## 2020-08-01 RX ADMIN — ACETAMINOPHEN 650 MG: 325 TABLET ORAL at 06:08

## 2020-08-01 NOTE — CARE UPDATE
08/01/20 1020   Patient Assessment/Suction   Level of Consciousness (AVPU) alert   Home Oxygen Qualification   Room Air SpO2 At Rest 93 %   Room Air SpO2 on Exertion 92 %   Heart Rate on O2 67 bpm   SpO2 on Recovery 92 %   Recovery Heart Rate 93 bpm   Home O2 Eval Comments patient was unable to ambulate very far without feeling very weak    Patient very weak and was not able to ambulate very far   for evaluation, was able to stand and take a few steps, had her do that  and move from sitting to standing back to sitting few times as tolerated and move her arms

## 2020-08-01 NOTE — PLAN OF CARE
Problem: Adult Inpatient Plan of Care  Goal: Plan of Care Review  Outcome: Ongoing, Progressing  Flowsheets (Taken 8/1/2020 0453)  Plan of Care Reviewed With:   patient   daughter  Pt supine with HOB low, alert and responsive, AAOx4 with spouse at bedside and call light within reach.  VSS with NAD noted; pt free of injury or falls.  Pt c/o back pain and frequent positions changed and restlessness noted; PRN tylenol ordered per S. BENNIE Reagan.  Nurse spoke to pt's daughter Kaylee; daughter concerned of lack of food intake for healing and energy.  Nutrition consult placed for foods specific to pt's normal food intake. Will continue to monitor.   Goal: Patient-Specific Goal (Individualization)  Outcome: Ongoing, Progressing  Flowsheets (Taken 8/1/2020 0453)  Individualized Care Needs: Safety, Fever, Nutrition  Anxieties, Fears or Concerns: Can I have some cereal?  Patient-Specific Goals (Include Timeframe): Pt will have increased food intake during meals by end of 8/1/20

## 2020-08-01 NOTE — PLAN OF CARE
POC reviewed with Patient VSS afebrile this shift Appetite good safety maintained,Isolation precautions maintained,SPO2 / Telemetry Monitored.Spo2 94-96%

## 2020-08-01 NOTE — PLAN OF CARE
"Pt  states "understanding," to d/c instructions and new medication administration, telemetry and IV removed, pt tolerated well, pt packed personal belongings per self, denies joint pain/discomfort prior to d/c, escorted to main lobby by staff, to home per private vehicle, safety maintain    "

## 2020-08-01 NOTE — PLAN OF CARE
Pt clear for discharge from case management standpoint. Pt discharging home with with no DME/HH/infusion needs. SW remains available.    SW noted in pt's chart that pt was feeling weak during her Home O2 eval and spoke with the NP about home health. Pt's daughter: Nato reports that pt does not like strangers in the home and declined services. SW alerted NP.        08/01/20 1201   Final Note   Assessment Type Final Discharge Note   Anticipated Discharge Disposition Home   Right Care Referral Info   Post Acute Recommendation No Care

## 2020-08-01 NOTE — NURSING
Secure chat sent to Karlee ruiz NP requesting Medication for diarrhea, Responded Stool needs to be checked for C-diff no new orders noted.On patient.

## 2020-08-01 NOTE — CARE UPDATE
08/01/20 0830   Patient Assessment/Suction   Level of Consciousness (AVPU) alert   All Lung Fields Breath Sounds diminished   PRE-TX-O2   O2 Device (Oxygen Therapy) nasal cannula   $ Is the patient on Low Flow Oxygen? Yes   Flow (L/min) 2   Oxygen Concentration (%) 28   SpO2 95 %   Pulse Oximetry Type Continuous   $ Pulse Oximetry - Multiple Charge Pulse Oximetry - Multiple   Pulse (!) 55   Resp 16

## 2020-08-02 NOTE — DISCHARGE SUMMARY
[facility] Hospital Medicine  Discharge Summary      Patient Name: Tamica Sherman  MRN: [id number]  Admission Date: 7/30/2020  Hospital Length of Stay: 2 days  Discharge Date and Time: 8/1/2020 12:20 PM  Attending Physician: Esthela att. providers found   Discharging Provider: Karlee Jimenez NP  Primary Care Provider: Oziel Campos MD      HPI:   Tamica Sherman is a 70 y.o. female with past medical history of diabetes mellitus type 2, hypertension, hyperlipidemia and deep vein thrombosis on oral anticoagulant who presents to the ED for evaluation of worsening of shortness of breath, generalized fatigue, diarrhea and headache which is progressively worsened over the last week.  She was diagnosed with COVID-19 on 07/23/2020 at the COVID clinic.  She also reports  The patient tested positive for COVID-19 on 7/23/2020 at the Covid Clinic.  According to her daughter she is had poor oral intake.  She denies fever and chest pain.   Her  tested positive for COVID-19  And will be admitted to .     Chest x-ray consistent with COVID-19 pneumonitis.     The patient's history is limited secondary to language barrier.  Interpretation provided by her daughter per request.        * No surgery found *      Hospital Course:   Patient monitor closely during hospitalization she was initiated on IV Rocephin and Zithromax and multi vitamins.  Oxygen saturation monitor closely.  CBC CMP, LDH, ferritin and CPR trended.  PT She is feeling good this morning.  Home oxygen evaluation obtained and she does not qualify.  Spoke with daughter Nato, and offered COVID surveillance program however she does not wishes for her parents.  She states her brother will monitor them closely at home.  She does not want home health.  Patient is stable for discharge from medical standpoint    Physical exam  Chest clear auscultation heart regular rate and rhythm.     Consults:     No new Assessment & Plan notes have been filed under this hospital  service since the last note was generated.  Service: Hospital Medicine    Final Active Diagnoses:    Diagnosis Date Noted POA    PRINCIPAL PROBLEM:  Pneumonia due to COVID-19 virus [U07.1, J12.89] 07/30/2020 Yes    Hypertension associated with type 2 diabetes mellitus [E11.59, I10] 07/30/2020 Yes    Type 2 diabetes mellitus, with long-term current use of insulin [E11.9, Z79.4] 07/30/2020 Not Applicable    Hyperlipidemia associated with type 2 diabetes mellitus [E11.69, E78.5] 07/30/2020 Yes    Compound heterozygous MTHFR mutation [id number] [E72.12] 04/01/2019 Yes      Problems Resolved During this Admission:       Discharged Condition: stable    Disposition: Home or Self Care    Follow Up:  Follow-up Information     Oziel Campos MD In 1 week.    Specialty: Internal Medicine  Contact information:  [address] [place] [address] [address]  [facility] [address]  [phone]                 Patient Instructions:      Ambulatory referral/consult to Home Health   Standing Status: Future   Referral Priority: Routine Referral Type: Home Health   Referral Reason: Specialty Services Required   Requested Specialty: Home Health Services   Number of Visits Requested: 1     Diet diabetic     Notify your health care provider if you experience any of the following:  severe uncontrolled pain     Notify your health care provider if you experience any of the following:  persistent nausea and vomiting or diarrhea     Notify your health care provider if you experience any of the following:  difficulty breathing or increased cough     Notify your health care provider if you experience any of the following:  severe persistent headache     Activity as tolerated       Significant Diagnostic Studies: Labs:   BMP:   Recent Labs   Lab 08/01/20  0550   *      K 4.1      CO2 23   BUN 16   CREATININE 0.7   CALCIUM 9.6    and CMP   Recent Labs   Lab 08/01/20  0550      K 4.1      CO2 23   *   BUN 16    CREATININE 0.7   CALCIUM 9.6   PROT 7.1   ALBUMIN 3.3*   BILITOT 0.4   ALKPHOS 77   AST 21   ALT 27   ANIONGAP 11   ESTGFRAFRICA >60   EGFRNONAA >60       Pending Diagnostic Studies:     None         Medications:  Reconciled Home Medications:      Medication List      START taking these medications    ascorbic acid (vitamin C) 500 MG tablet  Commonly known as: VITAMIN C  Take 1 tablet (500 mg total) by mouth 2 (two) times daily.     azithromycin 250 MG tablet  Commonly known as: Z-HARRIS  Take 1 tablet (250 mg total) by mouth once daily. for 4 days     BLOOD GLUCOSE TEST Strp  Generic drug: blood sugar diagnostic  100 each by Misc.(Non-Drug; Combo Route) route 4 (four) times daily before meals and nightly.     blood-glucose meter kit  Use as instructed     lancets Misc  Commonly known as: ACCU-CHEK SOFTCLIX LANCETS  1 each by Misc.(Non-Drug; Combo Route) route 4 (four) times daily with meals and nightly.     lisinopriL 10 MG tablet  Take 1 tablet (10 mg total) by mouth once daily.        CONTINUE taking these medications    INVOKANA 300 mg Tab tablet  Generic drug: canagliflozin  Take 300 mg by mouth once daily.     metFORMIN 1000 MG tablet  Commonly known as: GLUCOPHAGE  Take 1,000 mg by mouth 2 (two) times daily with meals.     rivaroxaban 20 mg Tab  Commonly known as: XARELTO  Take 1 tablet (20 mg total) by mouth daily with dinner or evening meal.     simvastatin 10 MG tablet  Commonly known as: ZOCOR  Take 10 mg by mouth every evening.     VASCEPA 1 gram Cap  Generic drug: icosapent ethyL  Take 1 g by mouth 4 (four) times daily.        STOP taking these medications    folic acid-vit B6-vit B12 2.5-25-2 mg 2.5-25-2 mg Tab  Commonly known as: FOLBIC or Equiv     ibuprofen 600 MG tablet  Commonly known as: ADVIL,MOTRIN     TRULICITY 0.75 mg/0.5 mL pen injector  Generic drug: dulaglutide     VOLTAREN 1 % Gel  Generic drug: diclofenac sodium            Indwelling Lines/Drains at time of discharge:    Lines/Drains/Airways     None                 Time spent on the discharge of patient: 60 minutes  Patient was seen and examined on the date of discharge and determined to be suitable for discharge.         Karlee Jimenez NP  Department of Hospital Medicine  [facility]

## 2020-08-02 NOTE — HOSPITAL COURSE
Patient monitor closely during hospitalization she was initiated on IV Rocephin and Zithromax and multi vitamins.  Oxygen saturation monitor closely.  CBC CMP, LDH, ferritin and CPR trended.  PT She is feeling good this morning.  Home oxygen evaluation obtained and she does not qualify.  Spoke with daughter Nato, and offered COVID surveillance program however she does not wishes for her parents.  She states her brother will monitor them closely at home.  She does not want home health.  Patient is stable for discharge from medical standpoint    Physical exam  Chest clear auscultation heart regular rate and rhythm.

## 2020-08-12 ENCOUNTER — HOSPITAL ENCOUNTER (EMERGENCY)
Facility: HOSPITAL | Age: 70
Discharge: HOME | End: 2020-08-12
Attending: EMERGENCY MEDICINE
Payer: MEDICARE

## 2020-08-12 VITALS
DIASTOLIC BLOOD PRESSURE: 80 MMHG | OXYGEN SATURATION: 98 % | SYSTOLIC BLOOD PRESSURE: 186 MMHG | RESPIRATION RATE: 18 BRPM | TEMPERATURE: 98 F | HEART RATE: 69 BPM

## 2020-08-12 DIAGNOSIS — R10.13 ACUTE EPIGASTRIC PAIN: ICD-10-CM

## 2020-08-12 DIAGNOSIS — R10.11 RUQ PAIN: ICD-10-CM

## 2020-08-12 LAB
ALBUMIN SERPL BCP-MCNC: 3.5 G/DL (ref 3.5–5.2)
ALP SERPL-CCNC: 74 U/L (ref 55–135)
ALT SERPL W/O P-5'-P-CCNC: 28 U/L (ref 10–44)
ANION GAP SERPL CALC-SCNC: 8 MMOL/L (ref 8–16)
AST SERPL-CCNC: 18 U/L (ref 10–40)
BACTERIA #/AREA URNS HPF: NORMAL /HPF
BASOPHILS # BLD AUTO: 0.03 K/UL (ref 0–0.2)
BASOPHILS NFR BLD: 0.6 % (ref 0–1.9)
BILIRUB SERPL-MCNC: 0.4 MG/DL (ref 0.1–1)
BILIRUB UR QL STRIP: NEGATIVE
BUN SERPL-MCNC: 17 MG/DL (ref 8–23)
CALCIUM SERPL-MCNC: 8.8 MG/DL (ref 8.7–10.5)
CAOX CRY URNS QL MICRO: NORMAL
CHLORIDE SERPL-SCNC: 106 MMOL/L (ref 95–110)
CLARITY UR: CLEAR
CO2 SERPL-SCNC: 27 MMOL/L (ref 23–29)
COLOR UR: YELLOW
CREAT SERPL-MCNC: 0.8 MG/DL (ref 0.5–1.4)
DIFFERENTIAL METHOD: ABNORMAL
EOSINOPHIL # BLD AUTO: 0.1 K/UL (ref 0–0.5)
EOSINOPHIL NFR BLD: 2 % (ref 0–8)
ERYTHROCYTE [DISTWIDTH] IN BLOOD BY AUTOMATED COUNT: 12.5 % (ref 11.5–14.5)
EST. GFR  (AFRICAN AMERICAN): >60 ML/MIN/1.73 M^2
EST. GFR  (NON AFRICAN AMERICAN): >60 ML/MIN/1.73 M^2
GLUCOSE SERPL-MCNC: 88 MG/DL (ref 70–110)
GLUCOSE UR QL STRIP: ABNORMAL
HCT VFR BLD AUTO: 42.1 % (ref 37–48.5)
HGB BLD-MCNC: 13.4 G/DL (ref 12–16)
HGB UR QL STRIP: NEGATIVE
IMM GRANULOCYTES # BLD AUTO: 0.02 K/UL (ref 0–0.04)
IMM GRANULOCYTES NFR BLD AUTO: 0.4 % (ref 0–0.5)
KETONES UR QL STRIP: NEGATIVE
LEUKOCYTE ESTERASE UR QL STRIP: NEGATIVE
LIPASE SERPL-CCNC: 40 U/L (ref 4–60)
LYMPHOCYTES # BLD AUTO: 1.8 K/UL (ref 1–4.8)
LYMPHOCYTES NFR BLD: 36.1 % (ref 18–48)
MCH RBC QN AUTO: 31.3 PG (ref 27–31)
MCHC RBC AUTO-ENTMCNC: 31.8 G/DL (ref 32–36)
MCV RBC AUTO: 98 FL (ref 82–98)
MICROSCOPIC COMMENT: NORMAL
MONOCYTES # BLD AUTO: 0.5 K/UL (ref 0.3–1)
MONOCYTES NFR BLD: 9 % (ref 4–15)
NEUTROPHILS # BLD AUTO: 2.6 K/UL (ref 1.8–7.7)
NEUTROPHILS NFR BLD: 51.9 % (ref 38–73)
NITRITE UR QL STRIP: NEGATIVE
NRBC BLD-RTO: 0 /100 WBC
PH UR STRIP: 6 [PH] (ref 5–8)
PLATELET # BLD AUTO: 190 K/UL (ref 150–350)
PMV BLD AUTO: 9.2 FL (ref 9.2–12.9)
POTASSIUM SERPL-SCNC: 3.8 MMOL/L (ref 3.5–5.1)
PROT SERPL-MCNC: 6.5 G/DL (ref 6–8.4)
PROT UR QL STRIP: NEGATIVE
RBC # BLD AUTO: 4.28 M/UL (ref 4–5.4)
SODIUM SERPL-SCNC: 141 MMOL/L (ref 136–145)
SP GR UR STRIP: 1.01 (ref 1–1.03)
TROPONIN I SERPL DL<=0.01 NG/ML-MCNC: 0.01 NG/ML (ref 0–0.03)
URN SPEC COLLECT METH UR: ABNORMAL
UROBILINOGEN UR STRIP-ACNC: NEGATIVE EU/DL
WBC # BLD AUTO: 5.01 K/UL (ref 3.9–12.7)
YEAST URNS QL MICRO: NORMAL

## 2020-08-12 PROCEDURE — 99285 EMERGENCY DEPT VISIT HI MDM: CPT | Mod: 25

## 2020-08-12 PROCEDURE — 25500020 PHARM REV CODE 255

## 2020-08-12 PROCEDURE — 93005 ELECTROCARDIOGRAM TRACING: CPT

## 2020-08-12 PROCEDURE — 81000 URINALYSIS NONAUTO W/SCOPE: CPT

## 2020-08-12 PROCEDURE — 36415 COLL VENOUS BLD VENIPUNCTURE: CPT

## 2020-08-12 PROCEDURE — 84484 ASSAY OF TROPONIN QUANT: CPT

## 2020-08-12 PROCEDURE — 83690 ASSAY OF LIPASE: CPT

## 2020-08-12 PROCEDURE — 85025 COMPLETE CBC W/AUTO DIFF WBC: CPT

## 2020-08-12 PROCEDURE — 80053 COMPREHEN METABOLIC PANEL: CPT

## 2020-08-12 RX ADMIN — IOHEXOL 75 ML: 350 INJECTION, SOLUTION INTRAVENOUS at 12:08

## 2020-08-12 NOTE — ED PROVIDER NOTES
Encounter Date: 8/12/2020    SCRIBE #1 NOTE: [employer]More, am scribing for, and in the presence of, Dr. Irwin.       History     Chief Complaint   Patient presents with    Abdominal Pain     times a week, associated diarrhea     8/12/2020  9:56 AM     The patient is a 70 y.o. female  who presents with abdominal pain. Patient c/o acute onset of sharp upper abdominal pain which has been constant for the past few days. Pt reports pain in her slight back when she bends over. She denies any nausea, vomiting, diarrhea, fevers or chills. Pt has normal bowel movements. Patient has not taken any medications for pain. The patient tested positive for COVID-19 two weeks ago. PMHx of DM, HTN, DVT, HLD, anticardiolipin antibody positive, compound heterozygous MTHFR mutation. No SHx.    The history is provided by the patient. The history is limited by a language barrier. A  was used.     Review of patient's allergies indicates:  No Known Allergies  Past Medical History:   Diagnosis Date    Acute deep vein thrombosis (DVT) of left peroneal vein 1/6/2019    Anticardiolipin antibody positive 3/31/2019    Compound heterozygous MTHFR mutation [id number] 4/1/2019    Diabetes mellitus     High cholesterol     Hypertension      History reviewed. No pertinent surgical history.  Family History   Problem Relation Age of Onset    Cancer Father      Social History     Tobacco Use    Smoking status: Never Smoker    Smokeless tobacco: Never Used   Substance Use Topics    Alcohol use: Never     Frequency: Never    Drug use: Never     Review of Systems   Constitutional: Negative for appetite change, chills and fever.   HENT: Negative for congestion, rhinorrhea and sore throat.    Respiratory: Negative for cough and shortness of breath.    Cardiovascular: Negative for chest pain.   Gastrointestinal: Positive for abdominal pain. Negative for diarrhea, nausea and vomiting.   Genitourinary: Negative for dysuria.    Musculoskeletal: Negative for back pain and myalgias.   Skin: Negative for rash.   Neurological: Negative for weakness and numbness.   Hematological: Does not bruise/bleed easily.       Physical Exam     Initial Vitals [08/12/20 0918]   BP Pulse Resp Temp SpO2   (!) 145/76 65 18 98.1 °F (36.7 °C) 96 %      MAP       --         Physical Exam    Nursing note and vitals reviewed.  Constitutional: No distress.   HENT:   Head: Normocephalic and atraumatic.   Mouth/Throat: Mucous membranes are normal.   Eyes: EOM are normal. Pupils are equal, round, and reactive to light.   Neck: Normal range of motion.   Cardiovascular: Normal rate, regular rhythm, normal heart sounds and intact distal pulses. Exam reveals no gallop and no friction rub.    No murmur heard.  Pulmonary/Chest: Breath sounds normal. She has no wheezes. She has no rhonchi. She has no rales.   Abdominal: Soft. There is abdominal tenderness in the right upper quadrant and epigastric area. There is positive Xie's sign. There is no rebound and no guarding.   Musculoskeletal: Normal range of motion. No edema.   Neurological: She is alert and oriented to person, place, and time. She has normal strength.   Skin: Skin is dry. No rash noted. No erythema.   Psychiatric: She has a normal mood and affect.         ED Course   Procedures  Labs Reviewed   CBC W/ AUTO DIFFERENTIAL - Abnormal; Notable for the following components:       Result Value    Mean Corpuscular Hemoglobin 31.3 (*)     Mean Corpuscular Hemoglobin Conc 31.8 (*)     All other components within normal limits   URINALYSIS, REFLEX TO URINE CULTURE - Abnormal; Notable for the following components:    Glucose, UA 3+ (*)     All other components within normal limits    Narrative:     Specimen Source->Urine   COMPREHENSIVE METABOLIC PANEL   LIPASE   URINALYSIS MICROSCOPIC    Narrative:     Specimen Source->Urine   TROPONIN I     EKG Readings: (Independently Interpreted)   NSR with rate of 64. Normal axis.  Normal intervals. No STEMI.       Imaging Results          US Abdomen Limited (Gallbladder) (Final result)  Result time 08/12/20 11:11:14    Final result by Stanley March MD (08/12/20 11:11:14)                 Impression:      Positive sonographic Xie sign, however no imaging findings of acute cholecystitis.    Simple hepatic cysts.      Electronically signed by: Stanley March MD  Date:    08/12/2020  Time:    11:11             Narrative:    EXAMINATION:  US ABDOMEN LIMITED    CLINICAL HISTORY:  Right upper quadrant pain    TECHNIQUE:  Limited ultrasound of the right upper quadrant of the abdomen (including pancreas, liver, gallbladder, common bile duct, and right kidney) was performed.    COMPARISON:  02/04/2019    FINDINGS:  The visualized pancreas is unremarkable.  The IVC is normal caliber.  The gallbladder has a normal appearance.  No stones are identified.  Sonographic Xie sign is positive.  The common bile duct is normal caliber 3.1 mm.  The liver is normal in size measuring 11.2 cm in length.  Two left hepatic lobe simple cysts are present measuring 2.0 and 1.9 cm, respectively.  The right kidney is normal in size measuring 10.6 cm in length.                                 Medical Decision Making:   History:   Old Medical Records: I decided to obtain old medical records.  Independently Interpreted Test(s):   I have ordered and independently interpreted EKG Reading(s) - see prior notes  Clinical Tests:   Lab Tests: Ordered and Reviewed  Radiological Study: Reviewed and Ordered  Medical Tests: Reviewed and Ordered  ED Management:  This patient was interviewed and assessed emergently.  Initial vital signs significant for hypertension I would associated with pain.  Aggressive workup to investigate for life-threatening causes of abdominal pain, including labs, CT scan, right upper quadrant ultrasound found to be largely unremarkable for acute life-threatening pathology or notable contributing  disease.  I have low suspicion for biliary dyskinesia additionally.  Patient had improvement in the emergency department with symptomatic medication and she is educated that the cause for the pain currently is not well delineated.  Possibly associated with recovery from COVID-19.  She is educated about supportive care and is asked to slowly advance a bland diet while increasing clear liquid hydration.  She is asked to follow up with her primary care doctor or gastroenterologist as soon as possible regarding further monitoring and management.  Patient is agreeable with this plan for follow-up and was discharged stable condition.            Scribe Attestation:   Scribe #1: I performed the above scribed service and the documentation accurately describes the services I performed. I attest to the accuracy of the note.    I, Dr. Deniz Irwin, personally performed the services described in this documentation. All medical record entries made by the scribe were at my direction and in my presence.  I have reviewed the chart and agree that the record reflects my personal performance and is accurate and complete. Deniz Irwin MD.  7:51 AM 08/13/2020                        Clinical Impression:       ICD-10-CM ICD-9-CM   1. RUQ pain  R10.11 789.01   2. Acute epigastric pain  R10.13 789.06     338.19         Disposition:   Disposition: Discharged  Condition: Stable                        Deniz Irwin MD  08/13/20 0757

## 2021-01-12 ENCOUNTER — TELEPHONE (OUTPATIENT)
Dept: HEMATOLOGY/ONCOLOGY | Facility: CLINIC | Age: 71
End: 2021-01-12

## 2021-03-15 ENCOUNTER — TELEPHONE (OUTPATIENT)
Dept: HEMATOLOGY/ONCOLOGY | Facility: CLINIC | Age: 71
End: 2021-03-15

## 2021-05-03 ENCOUNTER — OFFICE VISIT (OUTPATIENT)
Dept: HEMATOLOGY/ONCOLOGY | Facility: CLINIC | Age: 71
End: 2021-05-03
Payer: MEDICARE

## 2021-05-03 VITALS
TEMPERATURE: 98 F | WEIGHT: 135.63 LBS | BODY MASS INDEX: 25.62 KG/M2 | DIASTOLIC BLOOD PRESSURE: 75 MMHG | HEART RATE: 58 BPM | RESPIRATION RATE: 18 BRPM | SYSTOLIC BLOOD PRESSURE: 127 MMHG

## 2021-05-03 DIAGNOSIS — R76.0 ANTICARDIOLIPIN ANTIBODY POSITIVE: ICD-10-CM

## 2021-05-03 DIAGNOSIS — I82.452 ACUTE DEEP VEIN THROMBOSIS (DVT) OF LEFT PERONEAL VEIN: Primary | ICD-10-CM

## 2021-05-03 DIAGNOSIS — Z15.89 COMPOUND HETEROZYGOUS MTHFR MUTATION C677T/A1298C: ICD-10-CM

## 2021-05-03 PROCEDURE — 99213 OFFICE O/P EST LOW 20 MIN: CPT | Mod: S$GLB,,, | Performed by: INTERNAL MEDICINE

## 2021-05-03 PROCEDURE — 99213 PR OFFICE/OUTPT VISIT, EST, LEVL III, 20-29 MIN: ICD-10-PCS | Mod: S$GLB,,, | Performed by: INTERNAL MEDICINE

## 2021-05-04 LAB
ALBUMIN SERPL-MCNC: 4.1 G/DL (ref 3.8–4.8)
ALBUMIN/GLOB SERPL: 1.9 {RATIO} (ref 1.2–2.2)
ALP SERPL-CCNC: 91 IU/L (ref 39–117)
ALT SERPL-CCNC: 28 IU/L (ref 0–32)
AST SERPL-CCNC: 22 IU/L (ref 0–40)
BASOPHILS # BLD AUTO: 0 X10E3/UL (ref 0–0.2)
BASOPHILS NFR BLD AUTO: 1 %
BILIRUB SERPL-MCNC: 0.3 MG/DL (ref 0–1.2)
BUN SERPL-MCNC: 21 MG/DL (ref 8–27)
BUN/CREAT SERPL: 25 (ref 12–28)
CALCIUM SERPL-MCNC: 9.1 MG/DL (ref 8.7–10.3)
CHLORIDE SERPL-SCNC: 106 MMOL/L (ref 96–106)
CO2 SERPL-SCNC: 23 MMOL/L (ref 20–29)
CREAT SERPL-MCNC: 0.84 MG/DL (ref 0.57–1)
EOSINOPHIL # BLD AUTO: 0.1 X10E3/UL (ref 0–0.4)
EOSINOPHIL NFR BLD AUTO: 3 %
ERYTHROCYTE [DISTWIDTH] IN BLOOD BY AUTOMATED COUNT: 12.7 % (ref 11.7–15.4)
GLOBULIN SER CALC-MCNC: 2.2 G/DL (ref 1.5–4.5)
GLUCOSE SERPL-MCNC: 162 MG/DL (ref 65–99)
HCT VFR BLD AUTO: 39.2 % (ref 34–46.6)
HCYS SERPL-SCNC: 6.2 UMOL/L (ref 0–17.2)
HGB BLD-MCNC: 13.4 G/DL (ref 11.1–15.9)
IMM GRANULOCYTES # BLD AUTO: 0 X10E3/UL (ref 0–0.1)
IMM GRANULOCYTES NFR BLD AUTO: 0 %
LYMPHOCYTES # BLD AUTO: 1.7 X10E3/UL (ref 0.7–3.1)
LYMPHOCYTES NFR BLD AUTO: 41 %
MCH RBC QN AUTO: 32.4 PG (ref 26.6–33)
MCHC RBC AUTO-ENTMCNC: 34.2 G/DL (ref 31.5–35.7)
MCV RBC AUTO: 95 FL (ref 79–97)
MONOCYTES # BLD AUTO: 0.3 X10E3/UL (ref 0.1–0.9)
MONOCYTES NFR BLD AUTO: 7 %
NEUTROPHILS # BLD AUTO: 2.1 X10E3/UL (ref 1.4–7)
NEUTROPHILS NFR BLD AUTO: 48 %
PLATELET # BLD AUTO: 162 X10E3/UL (ref 150–450)
POTASSIUM SERPL-SCNC: 4.2 MMOL/L (ref 3.5–5.2)
PROT SERPL-MCNC: 6.3 G/DL (ref 6–8.5)
RBC # BLD AUTO: 4.13 X10E6/UL (ref 3.77–5.28)
SODIUM SERPL-SCNC: 143 MMOL/L (ref 134–144)
WBC # BLD AUTO: 4.3 X10E3/UL (ref 3.4–10.8)

## 2022-05-16 ENCOUNTER — HOSPITAL ENCOUNTER (EMERGENCY)
Facility: HOSPITAL | Age: 72
Discharge: HOME | End: 2022-05-16
Attending: EMERGENCY MEDICINE
Payer: MEDICARE

## 2022-05-16 VITALS
WEIGHT: 135 LBS | RESPIRATION RATE: 16 BRPM | OXYGEN SATURATION: 95 % | HEART RATE: 61 BPM | SYSTOLIC BLOOD PRESSURE: 202 MMHG | TEMPERATURE: 98 F | DIASTOLIC BLOOD PRESSURE: 97 MMHG | BODY MASS INDEX: 25.49 KG/M2 | HEIGHT: 61 IN

## 2022-05-16 DIAGNOSIS — M79.662 PAIN OF LEFT CALF: ICD-10-CM

## 2022-05-16 DIAGNOSIS — M54.50 ACUTE MIDLINE LOW BACK PAIN WITHOUT SCIATICA: ICD-10-CM

## 2022-05-16 DIAGNOSIS — M79.605 LEFT LEG PAIN: Primary | ICD-10-CM

## 2022-05-16 PROCEDURE — 25000003 PHARM REV CODE 250: Performed by: EMERGENCY MEDICINE

## 2022-05-16 PROCEDURE — 99284 EMERGENCY DEPT VISIT MOD MDM: CPT | Mod: 25

## 2022-05-16 RX ORDER — LISINOPRIL 10 MG/1
10 TABLET ORAL DAILY
Qty: 30 TABLET | Refills: 2 | Status: SHIPPED | OUTPATIENT
Start: 2022-05-16 | End: 2023-05-16

## 2022-05-16 RX ORDER — HYDROCODONE BITARTRATE AND ACETAMINOPHEN 10; 325 MG/1; MG/1
1 TABLET ORAL
Status: COMPLETED | OUTPATIENT
Start: 2022-05-16 | End: 2022-05-16

## 2022-05-16 RX ORDER — SIMVASTATIN 20 MG/1
20 TABLET, FILM COATED ORAL NIGHTLY
Qty: 90 TABLET | Refills: 3 | Status: SHIPPED | OUTPATIENT
Start: 2022-05-16 | End: 2023-05-16

## 2022-05-16 RX ORDER — HYDROCODONE BITARTRATE AND ACETAMINOPHEN 5; 325 MG/1; MG/1
1 TABLET ORAL EVERY 4 HOURS PRN
Qty: 10 TABLET | Refills: 0 | Status: SHIPPED | OUTPATIENT
Start: 2022-05-16 | End: 2022-05-26

## 2022-05-16 RX ORDER — ICOSAPENT ETHYL 1000 MG/1
1 CAPSULE ORAL 2 TIMES DAILY
Qty: 60 CAPSULE | Refills: 2 | Status: SHIPPED | OUTPATIENT
Start: 2022-05-16

## 2022-05-16 RX ORDER — METFORMIN HYDROCHLORIDE 1000 MG/1
1000 TABLET ORAL 2 TIMES DAILY
Qty: 60 TABLET | Refills: 2 | Status: SHIPPED | OUTPATIENT
Start: 2022-05-16 | End: 2023-05-16

## 2022-05-16 RX ORDER — DIPHENHYDRAMINE HCL 25 MG
25 CAPSULE ORAL
Status: COMPLETED | OUTPATIENT
Start: 2022-05-16 | End: 2022-05-16

## 2022-05-16 RX ADMIN — DIPHENHYDRAMINE HYDROCHLORIDE 25 MG: 25 CAPSULE ORAL at 10:05

## 2022-05-16 RX ADMIN — HYDROCODONE BITARTRATE AND ACETAMINOPHEN 1 TABLET: 10; 325 TABLET ORAL at 09:05

## 2022-05-16 NOTE — ED PROVIDER NOTES
Encounter Date: 5/16/2022    SCRIBE #1 NOTE: IMargaret, am scribing for, and in the presence of, Nino Mederos MD.       History     Chief Complaint   Patient presents with    Leg Pain     Left leg x 1 week      Time seen by provider: 8:19 AM on 05/16/2022    Tamica Sherman is a 72 y.o. female with a PMHx of DM, HTN, high cholesterol, prior DVT, anticardiolipin antibody positive and chronic pain syndrome of the left leg who presents to the ED with an onset of left leg pain for 1 week that sometimes radiates up to her lower back. Patient denies injury or heavy lifting. Patient denies current hx of smoking. The patient denies chest pain, SOB or any other symptoms at this time. Per chart review, patient was on Xarelto which was discontinued in May 2021. If she were to develop another clot, her doctor recommended she be on blood thinners for the rest of her life according to prior records.  Son is here and translates for the patient.      The history is provided by the patient and medical records. The history is limited by a language barrier. No  was used (patient gave consent for son to translate).     Review of patient's allergies indicates:  No Known Allergies  Past Medical History:   Diagnosis Date    Acute deep vein thrombosis (DVT) of left peroneal vein 1/6/2019    Anticardiolipin antibody positive 3/31/2019    Compound heterozygous MTHFR mutation [id number] 4/1/2019    Diabetes mellitus     High cholesterol     Hypertension      No past surgical history on file.  Family History   Problem Relation Age of Onset    Cancer Father      Social History     Tobacco Use    Smoking status: Never Smoker    Smokeless tobacco: Never Used   Substance Use Topics    Alcohol use: Never    Drug use: Never     Review of Systems   Respiratory: Negative for shortness of breath.    Cardiovascular: Negative for chest pain.   Musculoskeletal: Positive for myalgias.   Hematological: Does not  bruise/bleed easily.       Physical Exam     Initial Vitals [05/16/22 0814]   BP Pulse Resp Temp SpO2   (!) 182/81 70 18 98 °F (36.7 °C) 97 %      MAP       --         Physical Exam    Nursing note and vitals reviewed.  Constitutional: She appears well-developed and well-nourished.   HENT:   Head: Normocephalic and atraumatic.   Eyes: EOM are normal.   Neck: Neck supple.   Normal range of motion.  Cardiovascular: Normal rate, regular rhythm and normal heart sounds. Exam reveals no gallop and no friction rub.    No murmur heard.  Good pulse in left foot.   Pulmonary/Chest: Breath sounds normal. No respiratory distress. She has no wheezes. She has no rhonchi. She has no rales.   Abdominal: She exhibits no distension. There is no abdominal tenderness.   Musculoskeletal:         General: Normal range of motion.      Cervical back: Normal range of motion and neck supple.      Comments: Left foot is warm and well perfused. Left calf tenderness. Negative straight leg raise on the left.  No lumbar tenderness.     Neurological: She is alert and oriented to person, place, and time.   Skin: Skin is warm and dry.   Psychiatric: She has a normal mood and affect. Her behavior is normal. Judgment and thought content normal.         ED Course   Procedures  Labs Reviewed - No data to display       Imaging Results          US Lower Extremity Veins Left (Final result)  Result time 05/16/22 09:21:02    Final result by Shay Avalos Jr., MD (05/16/22 09:21:02)                 Impression:      No evidence of deep venous thrombosis in the left lower extremity.      Electronically signed by: Shay Avalos MD  Date:    05/16/2022  Time:    09:21             Narrative:    EXAMINATION:  US LOWER EXTREMITY VEINS LEFT    CLINICAL HISTORY:  Pain in left lower leg    TECHNIQUE:  Duplex and color flow Doppler evaluation and graded compression of the left lower extremity veins was performed.    COMPARISON:  None    FINDINGS:  Left thigh  veins: The common femoral, femoral, popliteal, upper greater saphenous, and deep femoral veins are patent and free of thrombus. The veins are normally compressible and have normal phasic flow and augmentation response.    Left calf veins: The visualized calf veins are patent.    Contralateral CFV: The contralateral (right) common femoral vein is patent and free of thrombus.    Miscellaneous: None                               X-Ray Lumbar Spine Ap And Lateral (Final result)  Result time 05/16/22 09:20:13    Final result by Shay Avalos Jr., MD (05/16/22 09:20:13)                 Impression:      Degenerative disc disease at L3-4 and L4-5.  Mild wedge-shaped compression of the L1 vertebra, chronic.  Spondylitic spurring from L2-L5, mild.      Electronically signed by: Shay Avalos MD  Date:    05/16/2022  Time:    09:20             Narrative:    EXAMINATION:  XR LUMBAR SPINE AP AND LATERAL    CLINICAL HISTORY:  Low back pain;    TECHNIQUE:  AP, lateral and spot images were performed of the lumbar spine.    COMPARISON:  CT abdomen of August 12, 2020    FINDINGS:  There is mild wedge-shaped compression fracture of the L1 vertebra unchanged from the prior CT.  The rest of the lumbar vertebra are intact without evidence of fracture or compression.  Mild spondylitic spurring is seen from L2 to L5.  The alignment is normal.  No spondylolisthesis is seen.    There is disc space narrowing at L3-4 and L4-5 consistent with degenerative disc disease.  The rest of the intervertebral disc spaces are well maintained.                                 Medications   HYDROcodone-acetaminophen  mg per tablet 1 tablet (1 tablet Oral Given 5/16/22 0949)   diphenhydrAMINE capsule 25 mg (25 mg Oral Given 5/16/22 1022)     Medical Decision Making:   History:   Old Medical Records: I decided to obtain old medical records.  Old Records Summarized: records from clinic visits.  Independently Interpreted Test(s):   I have ordered  and independently interpreted X-rays - see prior notes.  Clinical Tests:   Radiological Study: Ordered and Reviewed          Scribe Attestation:   Scribe #1: I performed the above scribed service and the documentation accurately describes the services I performed. I attest to the accuracy of the note.        ED Course as of 05/16/22 1228   Mon May 16, 2022   0817 SpO2: 97 % [EF]   0817 Resp: 18 [EF]   0817 Pulse: 70 [EF]   0817 Temp src: Oral [EF]   0817 Temp: 98 °F (36.7 °C) [EF]   0817 BP(!): 182/81 [EF]   0923 US Lower Extremity Veins Left [EF]   0924 X-Ray Lumbar Spine Ap And Lateral [EF]   1032 Pharmacy contacted.  Patient has not been on Xarelto in over a year.  The only medication she has filled this year is vascepa [EF]   1054 72-year-old female presents with left calf pain for a week.  Also some low back pain.  I was able to see an old hematology note from last year and she has a history of chronic leg pain and also history of DVT.  At that time 1 year ago almost exactly she had stopped her Xarelto it sounds like of her own accord.  She saw Dr. Storey who was okay with her not continuing this.  Son reports that she needs refills of her home medications.  She is on lisinopril metformin and simvastatin and also vascepa.  He reports that she is out.  We called her pharmacy and she has only filled 1 of these medications at all this year.  She is clearly noncompliant.  I will refill these medications and have advised the patient and her family that she needs to go back to her doctor for checkup and regular refills of these important medications.  I will give her a small amount of pain medicine at home for the leg pain.  No sign of DVT at this time.  No sign of infection or any acute life-threatening event.  I think this leg pain is probably the chronic leg pain that the hematologist referred to in his note from May of 2021. Good pulse in the left foot no sign of ischemia. [EF]      ED Course User Index  [EF]  Nino Mederos MD           I, Dr. Mederos, personally performed the services described in this documentation. All medical record entries made by the scribe were at my direction and in my presence.  I have reviewed the chart and agree that the record reflects my personal performance and is accurate and complete.12:28 PM 05/16/2022      Clinical Impression:   Final diagnoses:  [M79.662] Pain of left calf  [M79.605] Left leg pain (Primary)  [M54.50] Acute midline low back pain without sciatica          ED Disposition Condition    Discharge Stable        ED Prescriptions     Medication Sig Dispense Start Date End Date Auth. Provider    lisinopriL 10 MG tablet Take 1 tablet (10 mg total) by mouth once daily. 30 tablet 5/16/2022 5/16/2023 Nino Mederos MD    metFORMIN (GLUCOPHAGE) 1000 MG tablet Take 1 tablet (1,000 mg total) by mouth 2 (two) times daily. 60 tablet 5/16/2022 5/16/2023 Nino Mederos MD    simvastatin (ZOCOR) 20 MG tablet Take 1 tablet (20 mg total) by mouth every evening. 90 tablet 5/16/2022 5/16/2023 Nino Mederos MD    icosapent ethyL (VASCEPA) 1 gram Cap Take 1 capsule (1 g total) by mouth 2 (two) times a day. 60 capsule 5/16/2022  Nino Mederos MD    HYDROcodone-acetaminophen (NORCO) 5-325 mg per tablet Take 1 tablet by mouth every 4 (four) hours as needed for Pain. 10 tablet 5/16/2022 5/26/2022 Nino Mederos MD        Follow-up Information     Follow up With Specialties Details Why Contact Info    [facility] Dept Emergency Medicine  As needed, If symptoms worsen [address] [place] [id number] [phone]    see your doctor               Nino Mederos MD  05/16/22 [id number]